# Patient Record
Sex: FEMALE | Race: BLACK OR AFRICAN AMERICAN | Employment: FULL TIME | URBAN - METROPOLITAN AREA
[De-identification: names, ages, dates, MRNs, and addresses within clinical notes are randomized per-mention and may not be internally consistent; named-entity substitution may affect disease eponyms.]

---

## 2018-05-14 ENCOUNTER — ANESTHESIA EVENT (OUTPATIENT)
Dept: SURGERY | Age: 58
End: 2018-05-14
Payer: COMMERCIAL

## 2018-05-14 NOTE — ANESTHESIA PREPROCEDURE EVALUATION
Anesthetic History               Review of Systems / Medical History  Patient summary reviewed, nursing notes reviewed and pertinent labs reviewed    Pulmonary                   Neuro/Psych              Cardiovascular                  Exercise tolerance: >4 METS     GI/Hepatic/Renal                Endo/Other    Diabetes: poorly controlled, type 2, using insulin         Other Findings            Physical Exam    Airway  Mallampati: I  TM Distance: 4 - 6 cm  Neck ROM: normal range of motion   Mouth opening: Normal     Cardiovascular  Regular rate and rhythm,  S1 and S2 normal,  no murmur, click, rub, or gallop             Dental    Dentition: Full upper dentures     Pulmonary  Breath sounds clear to auscultation               Abdominal  GI exam deferred       Other Findings            Anesthetic Plan    ASA: 2  Anesthesia type: regional - Lewellen block            Anesthetic plan and risks discussed with: Patient

## 2018-05-15 ENCOUNTER — ANESTHESIA (OUTPATIENT)
Dept: SURGERY | Age: 58
End: 2018-05-15
Payer: COMMERCIAL

## 2018-05-15 ENCOUNTER — HOSPITAL ENCOUNTER (OUTPATIENT)
Age: 58
Setting detail: OUTPATIENT SURGERY
Discharge: HOME | End: 2018-05-15
Attending: ORTHOPAEDIC SURGERY | Admitting: ORTHOPAEDIC SURGERY
Payer: COMMERCIAL

## 2018-05-15 VITALS
BODY MASS INDEX: 29.85 KG/M2 | RESPIRATION RATE: 16 BRPM | TEMPERATURE: 97.3 F | OXYGEN SATURATION: 91 % | HEART RATE: 57 BPM | SYSTOLIC BLOOD PRESSURE: 173 MMHG | WEIGHT: 158 LBS | DIASTOLIC BLOOD PRESSURE: 76 MMHG

## 2018-05-15 DIAGNOSIS — L76.82 PAIN AT SURGICAL INCISION: Primary | ICD-10-CM

## 2018-05-15 LAB
GLUCOSE BLD STRIP.AUTO-MCNC: 228 MG/DL (ref 65–100)
GLUCOSE BLD STRIP.AUTO-MCNC: 250 MG/DL (ref 65–100)

## 2018-05-15 PROCEDURE — 82962 GLUCOSE BLOOD TEST: CPT

## 2018-05-15 PROCEDURE — 77030010507 HC ADH SKN DERMBND J&J -B: Performed by: ORTHOPAEDIC SURGERY

## 2018-05-15 PROCEDURE — 74011000250 HC RX REV CODE- 250: Performed by: ORTHOPAEDIC SURGERY

## 2018-05-15 PROCEDURE — 77030033681 HC SPLNT P-CUT SAF BSNM -A: Performed by: ORTHOPAEDIC SURGERY

## 2018-05-15 PROCEDURE — 74011250637 HC RX REV CODE- 250/637: Performed by: ANESTHESIOLOGY

## 2018-05-15 PROCEDURE — 77030011640 HC PAD GRND REM COVD -A: Performed by: ORTHOPAEDIC SURGERY

## 2018-05-15 PROCEDURE — 77030011884 HC TAPE CST PLSTR BSNM -A: Performed by: ORTHOPAEDIC SURGERY

## 2018-05-15 PROCEDURE — 74011250636 HC RX REV CODE- 250/636

## 2018-05-15 PROCEDURE — 74011250636 HC RX REV CODE- 250/636: Performed by: ANESTHESIOLOGY

## 2018-05-15 PROCEDURE — 77030020143 HC AIRWY LARYN INTUB CGAS -A: Performed by: ANESTHESIOLOGY

## 2018-05-15 PROCEDURE — 76210000020 HC REC RM PH II FIRST 0.5 HR: Performed by: ORTHOPAEDIC SURGERY

## 2018-05-15 PROCEDURE — 76010000160 HC OR TIME 0.5 TO 1 HR INTENSV-TIER 1: Performed by: ORTHOPAEDIC SURGERY

## 2018-05-15 PROCEDURE — 88305 TISSUE EXAM BY PATHOLOGIST: CPT | Performed by: ORTHOPAEDIC SURGERY

## 2018-05-15 PROCEDURE — 76210000006 HC OR PH I REC 0.5 TO 1 HR: Performed by: ORTHOPAEDIC SURGERY

## 2018-05-15 PROCEDURE — 76060000033 HC ANESTHESIA 1 TO 1.5 HR: Performed by: ORTHOPAEDIC SURGERY

## 2018-05-15 PROCEDURE — 74011000250 HC RX REV CODE- 250

## 2018-05-15 PROCEDURE — 77030000032 HC CUF TRNQT ZIMM -B: Performed by: ORTHOPAEDIC SURGERY

## 2018-05-15 PROCEDURE — 77030018836 HC SOL IRR NACL ICUM -A: Performed by: ORTHOPAEDIC SURGERY

## 2018-05-15 RX ORDER — PROPOFOL 10 MG/ML
INJECTION, EMULSION INTRAVENOUS
Status: DISCONTINUED | OUTPATIENT
Start: 2018-05-15 | End: 2018-05-15 | Stop reason: HOSPADM

## 2018-05-15 RX ORDER — PROPOFOL 10 MG/ML
INJECTION, EMULSION INTRAVENOUS AS NEEDED
Status: DISCONTINUED | OUTPATIENT
Start: 2018-05-15 | End: 2018-05-15 | Stop reason: HOSPADM

## 2018-05-15 RX ORDER — LIDOCAINE HYDROCHLORIDE 10 MG/ML
0.1 INJECTION INFILTRATION; PERINEURAL AS NEEDED
Status: DISCONTINUED | OUTPATIENT
Start: 2018-05-15 | End: 2018-05-15 | Stop reason: HOSPADM

## 2018-05-15 RX ORDER — LIDOCAINE HYDROCHLORIDE 5 MG/ML
INJECTION, SOLUTION INFILTRATION; INTRAVENOUS AS NEEDED
Status: DISCONTINUED | OUTPATIENT
Start: 2018-05-15 | End: 2018-05-15 | Stop reason: HOSPADM

## 2018-05-15 RX ORDER — ONDANSETRON 2 MG/ML
INJECTION INTRAMUSCULAR; INTRAVENOUS AS NEEDED
Status: DISCONTINUED | OUTPATIENT
Start: 2018-05-15 | End: 2018-05-15 | Stop reason: HOSPADM

## 2018-05-15 RX ORDER — DEXAMETHASONE SODIUM PHOSPHATE 4 MG/ML
INJECTION, SOLUTION INTRA-ARTICULAR; INTRALESIONAL; INTRAMUSCULAR; INTRAVENOUS; SOFT TISSUE AS NEEDED
Status: DISCONTINUED | OUTPATIENT
Start: 2018-05-15 | End: 2018-05-15 | Stop reason: HOSPADM

## 2018-05-15 RX ORDER — MIDAZOLAM HYDROCHLORIDE 1 MG/ML
2 INJECTION, SOLUTION INTRAMUSCULAR; INTRAVENOUS
Status: DISCONTINUED | OUTPATIENT
Start: 2018-05-15 | End: 2018-05-15 | Stop reason: HOSPADM

## 2018-05-15 RX ORDER — SODIUM CHLORIDE 0.9 % (FLUSH) 0.9 %
5-10 SYRINGE (ML) INJECTION AS NEEDED
Status: DISCONTINUED | OUTPATIENT
Start: 2018-05-15 | End: 2018-05-15 | Stop reason: HOSPADM

## 2018-05-15 RX ORDER — OXYCODONE HYDROCHLORIDE 5 MG/1
10 TABLET ORAL
Status: DISCONTINUED | OUTPATIENT
Start: 2018-05-15 | End: 2018-05-15 | Stop reason: HOSPADM

## 2018-05-15 RX ORDER — SODIUM CHLORIDE 0.9 % (FLUSH) 0.9 %
5-10 SYRINGE (ML) INJECTION EVERY 8 HOURS
Status: DISCONTINUED | OUTPATIENT
Start: 2018-05-15 | End: 2018-05-15 | Stop reason: HOSPADM

## 2018-05-15 RX ORDER — MIDAZOLAM HYDROCHLORIDE 1 MG/ML
INJECTION, SOLUTION INTRAMUSCULAR; INTRAVENOUS AS NEEDED
Status: DISCONTINUED | OUTPATIENT
Start: 2018-05-15 | End: 2018-05-15 | Stop reason: HOSPADM

## 2018-05-15 RX ORDER — BUPIVACAINE HYDROCHLORIDE 2.5 MG/ML
INJECTION, SOLUTION EPIDURAL; INFILTRATION; INTRACAUDAL AS NEEDED
Status: DISCONTINUED | OUTPATIENT
Start: 2018-05-15 | End: 2018-05-15 | Stop reason: HOSPADM

## 2018-05-15 RX ORDER — CEFAZOLIN SODIUM 1 G/3ML
INJECTION, POWDER, FOR SOLUTION INTRAMUSCULAR; INTRAVENOUS AS NEEDED
Status: DISCONTINUED | OUTPATIENT
Start: 2018-05-15 | End: 2018-05-15 | Stop reason: HOSPADM

## 2018-05-15 RX ORDER — CEFAZOLIN SODIUM/WATER 2 G/20 ML
2 SYRINGE (ML) INTRAVENOUS ONCE
Status: DISCONTINUED | OUTPATIENT
Start: 2018-05-15 | End: 2018-05-15 | Stop reason: HOSPADM

## 2018-05-15 RX ORDER — SODIUM CHLORIDE, SODIUM LACTATE, POTASSIUM CHLORIDE, CALCIUM CHLORIDE 600; 310; 30; 20 MG/100ML; MG/100ML; MG/100ML; MG/100ML
125 INJECTION, SOLUTION INTRAVENOUS CONTINUOUS
Status: DISCONTINUED | OUTPATIENT
Start: 2018-05-15 | End: 2018-05-15 | Stop reason: HOSPADM

## 2018-05-15 RX ORDER — NALOXONE HYDROCHLORIDE 0.4 MG/ML
0.1 INJECTION, SOLUTION INTRAMUSCULAR; INTRAVENOUS; SUBCUTANEOUS AS NEEDED
Status: DISCONTINUED | OUTPATIENT
Start: 2018-05-15 | End: 2018-05-15 | Stop reason: HOSPADM

## 2018-05-15 RX ORDER — OXYCODONE HYDROCHLORIDE 5 MG/1
5 CAPSULE ORAL
Qty: 60 CAP | Refills: 0 | Status: SHIPPED | OUTPATIENT
Start: 2018-05-15 | End: 2022-05-06

## 2018-05-15 RX ADMIN — PROPOFOL 20 MG: 10 INJECTION, EMULSION INTRAVENOUS at 09:33

## 2018-05-15 RX ADMIN — PROPOFOL 10 MG: 10 INJECTION, EMULSION INTRAVENOUS at 09:26

## 2018-05-15 RX ADMIN — OXYCODONE HYDROCHLORIDE 10 MG: 5 TABLET ORAL at 10:35

## 2018-05-15 RX ADMIN — PROPOFOL 20 MG: 10 INJECTION, EMULSION INTRAVENOUS at 09:36

## 2018-05-15 RX ADMIN — PROPOFOL 20 MG: 10 INJECTION, EMULSION INTRAVENOUS at 09:30

## 2018-05-15 RX ADMIN — ONDANSETRON 4 MG: 2 INJECTION INTRAMUSCULAR; INTRAVENOUS at 09:57

## 2018-05-15 RX ADMIN — SODIUM CHLORIDE, SODIUM LACTATE, POTASSIUM CHLORIDE, AND CALCIUM CHLORIDE 125 ML/HR: 600; 310; 30; 20 INJECTION, SOLUTION INTRAVENOUS at 09:06

## 2018-05-15 RX ADMIN — PROPOFOL 160 MCG/KG/MIN: 10 INJECTION, EMULSION INTRAVENOUS at 09:26

## 2018-05-15 RX ADMIN — CEFAZOLIN SODIUM 2 G: 1 INJECTION, POWDER, FOR SOLUTION INTRAMUSCULAR; INTRAVENOUS at 09:22

## 2018-05-15 RX ADMIN — MIDAZOLAM HYDROCHLORIDE 2 MG: 1 INJECTION, SOLUTION INTRAMUSCULAR; INTRAVENOUS at 09:06

## 2018-05-15 RX ADMIN — DEXAMETHASONE SODIUM PHOSPHATE 4 MG: 4 INJECTION, SOLUTION INTRA-ARTICULAR; INTRALESIONAL; INTRAMUSCULAR; INTRAVENOUS; SOFT TISSUE at 09:57

## 2018-05-15 RX ADMIN — PROPOFOL 10 MG: 10 INJECTION, EMULSION INTRAVENOUS at 09:25

## 2018-05-15 RX ADMIN — MIDAZOLAM HYDROCHLORIDE 1 MG: 1 INJECTION, SOLUTION INTRAMUSCULAR; INTRAVENOUS at 09:25

## 2018-05-15 RX ADMIN — MIDAZOLAM HYDROCHLORIDE 1 MG: 1 INJECTION, SOLUTION INTRAMUSCULAR; INTRAVENOUS at 09:30

## 2018-05-15 RX ADMIN — LIDOCAINE HYDROCHLORIDE 50 ML: 5 INJECTION, SOLUTION INFILTRATION; INTRAVENOUS at 09:31

## 2018-05-15 RX ADMIN — PROPOFOL 150 MG: 10 INJECTION, EMULSION INTRAVENOUS at 09:41

## 2018-05-15 NOTE — ADDENDUM NOTE
Addendum  created 05/15/18 1114 by Cuca Phipps, CRNA    Anesthesia Intra Meds edited, Orders acknowledged in Narrator

## 2018-05-15 NOTE — ANESTHESIA PROCEDURE NOTES
Peripheral Block    Start time: 5/15/2018 9:26 AM  End time: 5/15/2018 9:33 AM  Performed by: Cece Bianchi  Authorized by: Angelina Rosas       Pre-procedure:    Indications: primary anesthetic    Preanesthetic Checklist: patient identified, risks and benefits discussed, site marked, timeout performed, anesthesia consent given and patient being monitored    Timeout Time: 09:25          Block Type:   Block Type:  Marcella block  Laterality:  Right  Patient Position:  Flat  Block Limb IV Checked: Yes    Esmarch exsanguination: Yes    Tourniquet Type:  Single  Tourniquet Location:  Above elbow  Tourniquet Inflated:  5/15/2018 9:30 AM  Inflation (mmHg):  275  Limb w/o Radial Pulse: Yes    Infused Agent:  Lidocaine 0.5%  Volume Infused (mL):  50  Tourniquet Deflated:  5/15/2018 10:05 AM    Assessment:    Injection Assessment:   Patient tolerance:  Patient tolerated the procedure well with no immediate complications

## 2018-05-15 NOTE — OP NOTES
[facility] REPORT    Rhonda Okeefe  MR#: [id number]  : 1960  ACCOUNT #: [de-identified]   DATE OF SERVICE: 05/15/2018    SURGEON:  Angela You MD    PREOPERATIVE DIAGNOSIS:  Right wrist dorsal ganglion cyst.    POSTOPERATIVE DIAGNOSIS:  Right wrist dorsal ganglion cyst.    PROCEDURE:  Right wrist dorsal soft tissue mass excision. ANESTHESIA:  Floral block converted to LMA. COMPLICATIONS:  None. SPECIMENS REMOVED:  Soft tissue mass for pathology. BLOOD LOSS:  Minimal.    IMPLANTS:  None. MEDICATIONS: 30 mL of 0.25% Marcaine plain. ASSISTANT:  none    PROCEDURE IN DETAIL:  After discussion of risks, benefits and alternatives, the patient expressed an understanding and strongly desired to proceed with surgical treatment. She was brought into the operating room. Floral block anesthesia was administered. The right upper extremity was prepped and draped in normal sterile fashion. The patient became somewhat confused and combative. She appeared to be medically stable. This appeared to be due to disinhibition from IV medications as well as possibly some pain from her blood pressure cuff. Ultimately, it was decided to convert to a laryngeal mask anesthesia. After that was complete, a timeout was performed. A transverse incision was made directly over the palpable dorsal mass. Meticulous dissection was carried out down to the level of the mass. The adjacent tendons were retracted in radial and ulnar directions in order to protect them. Meticulous dissection of the mass was carried out down to the level of the dorsal radiocarpal joint. There was a complex stalk to the mass rather than a simple stalk. That was excised. Palpation and inspection and probing were performed to verify that there was no residual cyst tissue. The mass appeared to be a complex ganglion cyst.  It was passed off for pathological evaluation.   The wound was copiously irrigated. A cosmetic skin closure was accomplished. Sterile dressings were applied. The patient tolerated the procedure well. There were no complications. The resected mass was sent for pathology. POSTOPERATIVE PLAN:  The patient was placed into a volar splint. She will have her sutures removed at 2 weeks postop and be converted into a Velcro wrist splint. She can perform gentle range of motion exercises.       MD KITA Barber / RN  D: 05/15/2018 10:16     T: 05/15/2018 10:34  JOB #: [id number]

## 2018-05-15 NOTE — IP AVS SNAPSHOT
[address] [facility] 
[phone] Patient: Luke Kelley MRN: [id number] About your hospitalization You were admitted on:  May 15, 2018 You last received care in the:  [facility] OP PACU You were discharged on:  May 15, 2018 Why you were hospitalized Your primary diagnosis was:  Not on File Follow-up Information Follow up With Details Comments Contact Info Rhianna Jean-Baptiste MD  Keep your follow up appointment as scheduled. [address] [facility] [address] Garfield Dominguez NP   [address] [facility] 
[phone] Discharge Orders None A check lior indicates which time of day the medication should be taken. My Medications START taking these medications Instructions Each Dose to Equal  
 Morning Noon Evening Bedtime  
 oxyCODONE 5 mg capsule Commonly known as:  OXYIR Your last dose was: Your next dose is: Take 1 Cap by mouth every four (4) hours as needed. Max Daily Amount: 30 mg.  
 5 mg CONTINUE taking these medications Instructions Each Dose to Equal  
 Morning Noon Evening Bedtime BASAGLAR KWIKPEN U-100 INSULIN SC Your last dose was: Your next dose is:    
   
   
 50 Units by SubCUTAneous route every twelve (12) hours. 50 Units LIPITOR PO Your last dose was: Your next dose is: Take  by mouth nightly. metFORMIN 1,000 mg tablet Commonly known as:  GLUCOPHAGE Your last dose was: Your next dose is:    
   
   
 take 1,000 mg by mouth two (2) times daily (with meals). 1000 mg Where to Get Your Medications Information on where to get these meds will be given to you by the nurse or doctor. ! Ask your nurse or doctor about these medications  
  oxyCODONE 5 mg capsule Opioid Education Prescription Opioids: What You Need to Know: 
 
Prescription opioids can be used to help relieve moderate-to-severe pain and are often prescribed following a surgery or injury, or for certain health conditions. These medications can be an important part of treatment but also come with serious risks. Opioids are strong pain medicines. Examples include hydrocodone, oxycodone, fentanyl, and morphine. Heroin is an example of an illegal opioid. It is important to work with your health care provider to make sure you are getting the safest, most effective care. WHAT ARE THE RISKS AND SIDE EFFECTS OF OPIOID USE? Prescription opioids carry serious risks of addiction and overdose, especially with prolonged use. An opioid overdose, often marked by slow breathing, can cause sudden death. The use of prescription opioids can have a number of side effects as well, even when taken as directed. · Tolerance-meaning you might need to take more of a medication for the same pain relief · Physical dependence-meaning you have symptoms of withdrawal when the medication is stopped. Withdrawal symptoms can include nausea, sweating, chills, diarrhea, stomach cramps, and muscle aches. Withdrawal can last up to several weeks, depending on which drug you took and how long you took it. · Increased sensitivity to pain · Constipation · Nausea, vomiting, and dry mouth · Sleepiness and dizziness · Confusion · Depression · Low levels of testosterone that can result in lower sex drive, energy, and strength · Itching and sweating RISKS ARE GREATER WITH:      
· History of drug misuse, substance use disorder, or overdose · Mental health conditions (such as depression or anxiety) · Sleep apnea · Older age (72 years or older) · Pregnancy Avoid alcohol while taking prescription opioids.   Also, unless specifically advised by your health care provider, medications to avoid include: · Benzodiazepines (such as Xanax or Valium) · Muscle relaxants (such as Soma or Flexeril) · Hypnotics (such as Ambien or Lunesta) · Other prescription opioids KNOW YOUR OPTIONS Talk to your health care provider about ways to manage your pain that don't involve prescription opioids. Some of these options may actually work better and have fewer risks and side effects. Options may include: 
· Pain relievers such as acetaminophen, ibuprofen, and naproxen · Some medications that are also used for depression or seizures · Physical therapy and exercise · Counseling to help patients learn how to cope better with triggers of pain and stress. · Application of heat or cold compress · Massage therapy · Relaxation techniques Be Informed Make sure you know the name of your medication, how much and how often to take it, and its potential risks & side effects. IF YOU ARE PRESCRIBED OPIOIDS FOR PAIN: 
· Never take opioids in greater amounts or more often than prescribed. Remember the goal is not to be pain-free but to manage your pain at a tolerable level. · Follow up with your primary care provider to: · Work together to create a plan on how to manage your pain. · Talk about ways to help manage your pain that don't involve prescription opioids. · Talk about any and all concerns and side effects. · Help prevent misuse and abuse. · Never sell or share prescription opioids · Help prevent misuse and abuse. · Store prescription opioids in a secure place and out of reach of others (this may include visitors, children, friends, and family). · Safely dispose of unused/unwanted prescription opioids: Find your community drug take-back program or your pharmacy mail-back program, or flush them down the toilet, following guidance from the Food and Drug Administration (www.fda.gov/Drugs/ResourcesForYou). · Visit www.cdc.gov/drugoverdose to learn about the risks of opioid abuse and overdose. · If you believe you may be struggling with addiction, tell your health care provider and ask for guidance or call Zachery Crenshaw at 0-865-584-WZGT. Discharge Instructions Elevate operative hand Move fingers often Expect finger swelling Call office for questions or problems Keep splint and dressing in place until return visit ACTIVITY · As tolerated and as directed by your doctor. · Bathe or shower as directed by your doctor. DIET · Clear liquids until no nausea or vomiting; then light diet for the first day. · Advance to regular diet on second day, unless your doctor orders otherwise. · If nausea and vomiting continues, call your doctor. PAIN 
· Take pain medication as directed by your doctor. · Call your doctor if pain is NOT relieved by medication. · DO NOT take aspirin of blood thinners unless directed by your doctor. DRESSING CARE  
 
 
CALL YOUR DOCTOR IF  
· Excessive bleeding that does not stop after holding pressure over the area · Temperature of 101 degrees F or above · Excessive redness, swelling or bruising, and/ or green or yellow, smelly discharge from incision AFTER ANESTHESIA · For the first 24 hours: DO NOT Drive, Drink alcoholic beverages, or Make important decisions. · Be aware of dizziness following anesthesia and while taking pain medication. APPOINTMENT DATE/ TIME 
 
YOUR DOCTOR'S PHONE NUMBER  
 
 
DISCHARGE SUMMARY from Nurse PATIENT INSTRUCTIONS: 
 
After general anesthesia or intravenous sedation, for 24 hours or while taking prescription Narcotics: · Limit your activities · Do not drive and operate hazardous machinery · Do not make important personal or business decisions · Do  not drink alcoholic beverages · If you have not urinated within 8 hours after discharge, please contact your surgeon on call. *  Please give a list of your current medications to your Primary Care Provider. *  Please update this list whenever your medications are discontinued, doses are 
    changed, or new medications (including over-the-counter products) are added. *  Please carry medication information at all times in case of emergency situations. These are general instructions for a healthy lifestyle: No smoking/ No tobacco products/ Avoid exposure to second hand smoke Surgeon General's Warning:  Quitting smoking now greatly reduces serious risk to your health. Obesity, smoking, and sedentary lifestyle greatly increases your risk for illness A healthy diet, regular physical exercise & weight monitoring are important for maintaining a healthy lifestyle You may be retaining fluid if you have a history of heart failure or if you experience any of the following symptoms:  Weight gain of 3 pounds or more overnight or 5 pounds in a week, increased swelling in our hands or feet or shortness of breath while lying flat in bed. Please call your doctor as soon as you notice any of these symptoms; do not wait until your next office visit. Recognize signs and symptoms of STROKE: 
 
F-face looks uneven A-arms unable to move or move unevenly S-speech slurred or non-existent T-time-call 911 as soon as signs and symptoms begin-DO NOT go Back to bed or wait to see if you get better-TIME IS BRAIN. Introducing [facility] HE[facility]! [employer] introduces Blogic patient portal. Now you can access parts of your medical record, email your doctor's office, and request medication refills online. 1. In your internet browser, go to [email or web address] 2. Click on the First Time User? Click Here link in the Sign In box. You will see the New Member Sign Up page. 3. Enter your "[employer] Access Code exactly as it appears below. You will not need to use this code after youve completed the sign-up process. If you do not sign up before the expiration date, you must request a new code. · "[employer] Access Code: [email or web address] Expires: 8/8/2018  5:04 PM 
 
4. Enter the last four digits of your Social Security Number (xxxx) and Date of Birth (mm/dd/yyyy) as indicated and click Submit. You will be taken to the next sign-up page. 5. Create a Gogii Gamest ID. This will be your "[employer] login ID and cannot be changed, so think of one that is secure and easy to remember. 6. Create a "[employer] password. You can change your password at any time. 7. Enter your Password Reset Question and Answer. This can be used at a later time if you forget your password. 8. Enter your e-mail address. You will receive e-mail notification when new information is available in [address] 9. Click Sign Up. You can now view and download portions of your medical record. 10. Click the Download Summary menu link to download a portable copy of your medical information. If you have questions, please visit the Frequently Asked Questions section of the "[employer] website. Remember, "[employer] is NOT to be used for urgent needs. For medical emergencies, dial 911. Now available from your iPhone and Android! Introducing Guru Rahman As a [employer] patient, I wanted to make you aware of our electronic visit tool called Guru Dagosissy. [employer] 24/7 allows you to connect within minutes with a medical provider 24 hours a day, seven days a week via a mobile device or tablet or logging into a secure website from your computer. You can access Guru Rahman from anywhere in the [place].  
 
A virtual visit might be right for you when you have a simple condition and feel like you just dont want to get out of bed, or cant get away from work for an appointment, when your regular [facility] provider is not available (evenings, weekends or holidays), or when youre out of town and need minor care. Electronic visits cost only $49 and if the [employer] [facility] 24/7 provider determines a prescription is needed to treat your condition, one can be electronically transmitted to a nearby pharmacy*. Please take a moment to enroll today if you have not already done so. The enrollment process is free and takes just a few minutes. To enroll, please download the [employer] 24/7 samara to your tablet or phone, or visit [email or web address] to enroll on your computer. And, as an [address] [place] [address] patient with a [employer] account, the results of your visits will be scanned into your electronic medical record and your primary care provider will be able to view the scanned results. We urge you to continue to see your regular [facility] provider for your ongoing medical care. And while your primary care provider may not be the one available when you seek a Guru Sliced Investingsissy virtual visit, the peace of mind you get from getting a real diagnosis real time can be priceless. For more information on [employer]ah[employer], view our Frequently Asked Questions (FAQs) at [email or web address]. Sincerely, 
 
Blanche Coombs MD 
Chief Medical Officer [address] *:  certain medications cannot be prescribed via [employer] Providers Seen During Your Hospitalization Provider Specialty Primary office phone Sarah Bangura MD Orthopedic Surgery [phone] Your Primary Care Physician (PCP) Primary Care Physician Office Phone Office Fax [place] [address] [phone] You are allergic to the following No active allergies Recent Documentation Weight BMI OB Status Smoking Status 71.7 kg 29.85 kg/m2 Hysterectomy Former Smoker Emergency Contacts Name Discharge Info Relation Home Work Mobile Joy Mcintosh  Daughter [21] [phone] Patient Belongings The following personal items are in your possession at time of discharge: 
  Dental Appliances: Uppers  Visual Aid: None      Home Medications: None   Jewelry: None  Clothing: Undergarments, Pants, Shirt, Footwear    Other Valuables: None Please provide this summary of care documentation to your next provider. Signatures-by signing, you are acknowledging that this After Visit Summary has been reviewed with you and you have received a copy. Patient Signature:  ____________________________________________________________ Date:  ____________________________________________________________  
  
Claudene Born Provider Signature:  ____________________________________________________________ Date:  ____________________________________________________________

## 2018-05-15 NOTE — DISCHARGE INSTRUCTIONS
Elevate operative hand  Move fingers often  Expect finger swelling  Call office for questions or problems  Keep splint and dressing in place until return visit     ACTIVITY  · As tolerated and as directed by your doctor. · Bathe or shower as directed by your doctor. DIET  · Clear liquids until no nausea or vomiting; then light diet for the first day. · Advance to regular diet on second day, unless your doctor orders otherwise. · If nausea and vomiting continues, call your doctor. PAIN  · Take pain medication as directed by your doctor. · Call your doctor if pain is NOT relieved by medication. · DO NOT take aspirin of blood thinners unless directed by your doctor. DRESSING CARE       CALL YOUR DOCTOR IF   · Excessive bleeding that does not stop after holding pressure over the area  · Temperature of 101 degrees F or above  · Excessive redness, swelling or bruising, and/ or green or yellow, smelly discharge from incision    AFTER ANESTHESIA   · For the first 24 hours: DO NOT Drive, Drink alcoholic beverages, or Make important decisions. · Be aware of dizziness following anesthesia and while taking pain medication. APPOINTMENT DATE/ TIME    YOUR DOCTOR'S PHONE NUMBER       DISCHARGE SUMMARY from Nurse    PATIENT INSTRUCTIONS:    After general anesthesia or intravenous sedation, for 24 hours or while taking prescription Narcotics:  · Limit your activities  · Do not drive and operate hazardous machinery  · Do not make important personal or business decisions  · Do  not drink alcoholic beverages  · If you have not urinated within 8 hours after discharge, please contact your surgeon on call. *  Please give a list of your current medications to your Primary Care Provider. *  Please update this list whenever your medications are discontinued, doses are      changed, or new medications (including over-the-counter products) are added.     *  Please carry medication information at all times in case of emergency situations. These are general instructions for a healthy lifestyle:    No smoking/ No tobacco products/ Avoid exposure to second hand smoke    Surgeon General's Warning:  Quitting smoking now greatly reduces serious risk to your health. Obesity, smoking, and sedentary lifestyle greatly increases your risk for illness    A healthy diet, regular physical exercise & weight monitoring are important for maintaining a healthy lifestyle    You may be retaining fluid if you have a history of heart failure or if you experience any of the following symptoms:  Weight gain of 3 pounds or more overnight or 5 pounds in a week, increased swelling in our hands or feet or shortness of breath while lying flat in bed. Please call your doctor as soon as you notice any of these symptoms; do not wait until your next office visit. Recognize signs and symptoms of STROKE:    F-face looks uneven    A-arms unable to move or move unevenly    S-speech slurred or non-existent    T-time-call 911 as soon as signs and symptoms begin-DO NOT go       Back to bed or wait to see if you get better-TIME IS BRAIN.

## 2018-05-15 NOTE — ANESTHESIA POSTPROCEDURE EVALUATION
Post-Anesthesia Evaluation and Assessment    Patient: Latrice Freeman MRN: [id number]  SSN: [id number]    YOB: 1960  Age: 59 y.o. Sex: female       Cardiovascular Function/Vital Signs  Visit Vitals    /76    Pulse (!) 57    Temp 36.3 °C (97.3 °F)    Resp 14    Wt 71.7 kg (158 lb)    SpO2 91%    BMI 29.85 kg/m2       Patient is status post regional anesthesia for Procedure(s):  GANGLION CYST EXCISION RIGHT. Nausea/Vomiting: None    Postoperative hydration reviewed and adequate. Pain:  Pain Scale 1: Numeric (0 - 10) (05/15/18 1021)  Pain Intensity 1: 6 (05/15/18 1021)   Managed    Neurological Status:   Neuro (WDL): Exceptions to WDL (05/15/18 1021)  Neuro  Neurologic State: Drowsy (05/15/18 1021)  LUE Motor Response: Purposeful (05/15/18 1021)  LLE Motor Response: Purposeful (05/15/18 1021)  RUE Motor Response: Purposeful (05/15/18 1021)  RLE Motor Response: Purposeful (05/15/18 1021)   At baseline    Mental Status and Level of Consciousness: Arousable    Pulmonary Status:   O2 Device: Room air (05/15/18 1053)   Adequate oxygenation and airway patent    Complications related to anesthesia: None    Post-anesthesia assessment completed.  No concerns    Signed By: Ryan Shen MD     May 15, 2018

## 2018-05-30 ENCOUNTER — HOSPITAL ENCOUNTER (OUTPATIENT)
Dept: PHYSICAL THERAPY | Age: 58
End: 2018-05-30

## 2018-06-04 ENCOUNTER — HOSPITAL ENCOUNTER (OUTPATIENT)
Dept: PHYSICAL THERAPY | Age: 58
Discharge: HOME | End: 2018-06-04
Payer: COMMERCIAL

## 2018-06-04 PROCEDURE — 97110 THERAPEUTIC EXERCISES: CPT

## 2018-06-04 PROCEDURE — 97161 PT EVAL LOW COMPLEX 20 MIN: CPT

## 2018-06-04 NOTE — THERAPY EVALUATION
Autumn Brown  : 1960  Primary: Isaac Mullenie Jacky Of [facility]as*  Secondary:  [facility]rapy Center at [address], [address] [place] [address],[address], [place] [address].  [phone]   [phone]        OUTPATIENT PHYSICAL THERAPY:Initial Assessment 2018 1   ICD-10: Treatment Diagnosis: Pain in right wrist (M25.531)  Precautions/Allergies: no precautions/ NKA   Fall Risk Score: 0 (? 5 = High Risk)  MD Orders: PT-evaluate and treat MEDICAL/REFERRING DIAGNOSIS:  M67.431 Right wrist  DATE OF ONSET: 5/15/18  REFERRING PHYSICIAN: Donny Monte MD  RETURN PHYSICIAN APPOINTMENT: 18     INITIAL ASSESSMENT:  Ms. Arthur Stewart comes to therapy S/P ganglion cyst removal at the right wrist. She presents with decreased wrist and forearm motion; weakness in the wrist, hand and fingers; movement pain and numbness over the dorsum of her wrist and hand. She is not taking the prescribed pain medication due to unavailability at the pharmacy. She uses her hands during the entire shift at work and also needs to care for her home and grand children. Ms. Arthur Stewart will benefit from skilled therapy to address her deficits and assist in the functional return of her dominant hand for independent ADL's and job duties. PROBLEM LIST (Impacting functional limitations):  1. Decreased Strength  2. Decreased ADL/Functional Activities  3. Increased Pain  4. Decreased Activity Tolerance INTERVENTIONS PLANNED:  1. Cold  2. Heat  3. Home Exercise Program (HEP)  4. Manual Therapy  5. Range of Motion (ROM)  6. Therapeutic Exercise/Strengthening  7. Ultrasound (US)   TREATMENT PLAN:  Effective Dates: 2018 TO 8/3/2018 (60 days). Frequency/Duration: 2-3 times a week for 60 Days  GOALS: (Goals have been discussed and agreed upon with patient.)  Discharge Goals: Time Frame:  60 days     Independent with HEP  2. AROM WNL to allow independent ADL's without compensation  3. Strength 5/5 to allow safe transfers Tolerate gait > 45 minutes for community integration  2. ADL's with pain < 2/10  Rehabilitation Potential For Stated Goals: Good  Regarding Nay Polanco's therapy, I certify that the treatment plan above will be carried out by a therapist or under their direction. Thank you for this referral,  Rafia Longo, PT ,MSPT      Referring Physician Signature: Chapo Priest MD              Date                    The information in this section was collected on 6/4/18 (except where otherwise noted). HISTORY:   History of Present Injury/Illness (Reason for Referral):  Pt reports having the cyst for years but that it began to increase in size and become so painful that she was unable to write or lift with the right hand. Past Medical History/Comorbidities:   Ms. Kym Booth  has a past medical history of Diabetes. Unremarkable for the right wrist  Social History/Living Environment:     lives with family in apartment. 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 Personal Factors: Other factors that influence how disability is experienced by the patient:  Job requirements, have to drive 30 miles each way to work  Current Medications:       Current Outpatient Prescriptions:    * Gabapentin for shingles    insulin glargine,hum.rec.anlog (BASAGLAR KWIKPEN U-100 INSULIN SC)- may stop due to cost going up to $800     atorvastatin calcium (LIPITOR PO), Take  by mouth nightly., Disp: , Rfl:     METFORMIN 1,000 mg tablet, take 1,000 mg by mouth two (2) times daily (with meals). , Disp: , Rfl:    Date Last Reviewed:  6/4/18   Number of Personal Factors/Comorbidities that affect the Plan of Care: 1-2: MODERATE COMPLEXITY   EXAMINATION:   Observation/Orthostatic Postural Assessment: Steri strips intact covered with Water resistant bandage. No swelling. No redness. Incision healing well   Palpation:          Diminished sensation over the carpal region/ dorsum of the right hand . ROM: BUE WNL except:                RUE AROM  R Forearm Supination: 75  R Wrist Flexion: 14  R Wrist Extension: 37                        Strength: lumbricals 4-, dorsal interossei 4-, thumb 5/5       RUE Strength  R Forearm Supination: 4+  R Wrist Flexion: 4-  R Wrist Extension: 3+ (observed)              Body Structures Involved:  1. Nerves  2. Joints  3. Muscles  4. Ligaments  5. skin Body Functions Affected:  1. Sensory/Pain  2. Movement Related Activities and Participation Affected:  1. General Tasks and Demands  2. Self Care  3. Domestic Life   Number of elements (examined above) that affect the Plan of Care: 3: MODERATE COMPLEXITY   CLINICAL PRESENTATION:   Presentation: Stable and uncomplicated: LOW COMPLEXITY   CLINICAL DECISION MAKING:   Outcome Measure: Tool Used: Disabilities of the Arm, Shoulder and Hand (DASH) Questionnaire - Quick Version  Score:  Initial: 37/55 or 59% Most Recent: X/55 (Date: -- )   Interpretation of Score: The DASH is designed to measure the activities of daily living in person's with upper extremity dysfunction or pain. Each section is scored on a 1-5 scale, 5 representing the greatest disability. The scores of each section are added together for a total score of 55. Score 11 12-19 20-28 29-37 38-45 46-54 55   Modifier CH CI CJ CK CL CM CN     ? Carrying, Moving, and Handling Objects:     - CURRENT STATUS: CK - 40%-59% impaired, limited or restricted    - GOAL STATUS: CJ - 20%-39% impaired, limited or restricted    - D/C STATUS:  ---------------To be determined---------------      Medical Necessity:   · Skilled intervention continues to be required due to need for modalities.   Reason for Services/Other Comments:  · Patient continues to require skilled intervention due to need for progression of ROM and guidance into exercercises. Use of outcome tool(s) and clinical judgement create a POC that gives a: Questionable prediction of patient's progress: MODERATE COMPLEXITY            TREATMENT:   (In addition to Assessment/Re-Assessment sessions the following treatments were rendered)  Pre-treatment Symptoms/Complaints:  Pt complains of numbness over the top of her hand, weakness in her fingers and pain with movement. Pain: Initial:     7/10 Post Session:  5-6/10     THERAPEUTIC EXERCISE: (10 minutes):  Exercises to improve mobility and strength. Required moderate verbal and manual cues to promote proper body posture and exercise technique. Reviewed and issued HEP with wrist flex/extn, ball squeezes and finger extension with small rubber band. Copy issued  MANUAL THERAPY: (5 minutes minutes): for ROM. PROM to right wrist flexion and extension with respect to pain  [employer] Portal  Treatment/Session Assessment:    · Response to Treatment:  Patient fearful of \"splitting it back open\". Good return demonstration of AROM exercises  · Compliance with Program/Exercises: Will assess as treatment progresses. · Recommendations/Intent for next treatment session: \"Next visit will focus on Manual treatment, Modalities and exercise modification as needed  · \".   Total Treatment Duration: 55 minutes  PT Patient Time In/Time Out  Time In: 0900  Time Out: Bonnie Denney 150, PT

## 2018-06-04 NOTE — PROGRESS NOTES
Ambulatory/Rehab Services H2 Model Falls Risk Assessment    Risk Factor Pts. ·   Confusion/Disorientation/Impulsivity  []    4 ·   Symptomatic Depression  []   2 ·   Altered Elimination  []   1 ·   Dizziness/Vertigo  []   1 ·   Gender (Male)  []   1 ·   Any administered antiepileptics (anticonvulsants):  []   2 ·   Any administered benzodiazepines:  []   1 ·   Visual Impairment (specify):  []   1 ·   Portable Oxygen Use  []   1 ·   Orthostatic ? BP  []   1 ·   History of Recent Falls (within 3 mos.)  []   5     Ability to Rise from Chair (choose one) Pts. ·   Ability to rise in a single movement  [x]   0 ·   Pushes up, successful in one attempt  []   1 ·   Multiple attempts, but successful  []   3 ·   Unable to rise without assistance  []   4   Total: (5 or greater = High Risk) 0     Falls Prevention Plan:   []                Physical Limitations to Exercise (specify):   []                Mobility Assistance Device (type):   []                Exercise/Equipment Adaptation (specify):    ©2010 [facility] [address] [place] [address] Patent #[id number].  Federal Law prohibits the replication, distribution or use without written permission from [facility] [employer]

## 2018-06-07 ENCOUNTER — HOSPITAL ENCOUNTER (OUTPATIENT)
Dept: PHYSICAL THERAPY | Age: 58
Discharge: HOME | End: 2018-06-07
Payer: COMMERCIAL

## 2018-06-07 PROCEDURE — 97140 MANUAL THERAPY 1/> REGIONS: CPT

## 2018-06-07 NOTE — PROGRESS NOTES
Christina Espinosa  : 1960  Primary: [facility] Jason Vogel [facility]*  Secondary:  Therapy Center at [address] [place] [address] Rd  [address] [facility], [address] [place] [address],[address], [place] [address].  [phone]   [phone]        OUTPATIENT PHYSICAL THERAPY:Daily Note 2018 2   ICD-10: Treatment Diagnosis: Pain in right wrist (M25.531)  Precautions/Allergies: no precautions/ NKA   Fall Risk Score: 0 (? 5 = High Risk)  MD Orders: PT-evaluate and treat MEDICAL/REFERRING DIAGNOSIS:  M67.431 Right wrist  DATE OF ONSET: 5/15/18  REFERRING PHYSICIAN: Fady Bruossard MD  RETURN PHYSICIAN APPOINTMENT: 18     INITIAL ASSESSMENT:  Ms. Paula Faust comes to therapy S/P ganglion cyst removal at the right wrist. She presents with decreased wrist and forearm motion; weakness in the wrist, hand and fingers; movement pain and numbness over the dorsum of her wrist and hand. She is not taking the prescribed pain medication due to unavailability at the pharmacy. She uses her hands during the entire shift at work and also needs to care for her home and grand children. Ms. Paula Faust will benefit from skilled therapy to address her deficits and assist in the functional return of her dominant hand for independent ADL's and job duties. PROBLEM LIST (Impacting functional limitations):  1. Decreased Strength  2. Decreased ADL/Functional Activities  3. Increased Pain  4. Decreased Activity Tolerance INTERVENTIONS PLANNED:  1. Cold  2. Heat  3. Home Exercise Program (HEP)  4. Manual Therapy  5. Range of Motion (ROM)  6. Therapeutic Exercise/Strengthening  7. Ultrasound (US)   TREATMENT PLAN:  Effective Dates: 2018 TO 8/3/2018 (60 days). Frequency/Duration: 2-3 times a week for 60 Days  GOALS: (Goals have been discussed and agreed upon with patient.)  Discharge Goals: Time Frame:  60 days  Independent with HEP  2. AROM WNL to allow independent ADL's without compensation  3. Strength 5/5 to allow safe lifting      Tolerate activity > 45 minutes for home care  2. ADL's with pain < 2/10    Rehabilitation Potential For Stated Goals: Good  Regarding Opal Polanco's therapy, I certify that the treatment plan above will be carried out by a therapist or under their direction. Thank you for this referral,  Caio Larios, PT                 The information in this section was collected on 6/4/18 (except where otherwise noted). HISTORY:   History of Present Injury/Illness (Reason for Referral):  Pt reports having the cyst for years but that it began to increase in size and become so painful that she was unable to write or lift with the right hand. Past Medical History/Comorbidities:   Ms. Damian Barney  has a past medical history of Diabetes. Unremarkable for the right wrist  Social History/Living Environment:     lives with family in apartment. 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 Personal Factors: Other factors that influence how disability is experienced by the patient:  Job requirements, have to drive 30 miles each way to work  Current Medications:       Current Outpatient Prescriptions:    * Gabapentin for shingles    insulin glargine,hum.rec.anlog (BASAGLAR KWIKPEN U-100 INSULIN SC)- may stop due to cost going up to $800     atorvastatin calcium (LIPITOR PO), Take  by mouth nightly., Disp: , Rfl:     METFORMIN 1,000 mg tablet, take 1,000 mg by mouth two (2) times daily (with meals). , Disp: , Rfl:    Date Last Reviewed:  6/4/18   Number of Personal Factors/Comorbidities that affect the Plan of Care: 1-2: MODERATE COMPLEXITY   EXAMINATION:   Observation/Orthostatic Postural Assessment:         Steri strips intact covered with Water resistant bandage.   Palpation:          n/t        ROM: BUE WNL except:                                         Strength: n/t                      Body Structures Involved:  1. Nerves  2. Joints  3. Muscles  4. Ligaments  5. skin Body Functions Affected:  1. Sensory/Pain  2. Movement Related Activities and Participation Affected:  1. General Tasks and Demands  2. Self Care  3. Domestic Life   Number of elements (examined above) that affect the Plan of Care: 3: MODERATE COMPLEXITY   CLINICAL PRESENTATION:   Presentation: Stable and uncomplicated: LOW COMPLEXITY   CLINICAL DECISION MAKING:   Outcome Measure: Tool Used: Disabilities of the Arm, Shoulder and Hand (DASH) Questionnaire - Quick Version  Score:  Initial: 37/55 or 59% Most Recent: X/55 (Date: -- )   Interpretation of Score: The DASH is designed to measure the activities of daily living in person's with upper extremity dysfunction or pain. Each section is scored on a 1-5 scale, 5 representing the greatest disability. The scores of each section are added together for a total score of 55. Score 11 12-19 20-28 29-37 38-45 46-54 55   Modifier CH CI CJ CK CL CM CN     ? Carrying, Moving, and Handling Objects:     - CURRENT STATUS: CK - 40%-59% impaired, limited or restricted    - GOAL STATUS: CJ - 20%-39% impaired, limited or restricted    - D/C STATUS:  ---------------To be determined---------------      Medical Necessity:   · Skilled intervention continues to be required due to need for modalities. Reason for Services/Other Comments:  · Patient continues to require skilled intervention due to need for progression of ROM and guidance into exercercises. Use of outcome tool(s) and clinical judgement create a POC that gives a: Questionable prediction of patient's progress: MODERATE COMPLEXITY            TREATMENT:   (In addition to Assessment/Re-Assessment sessions the following treatments were rendered)  Pre-treatment Symptoms/Complaints:  Pt reports she has been doing her HEP.    Pain: Initial:     5/10 Post Session:  5-6/10       MANUAL THERAPY: (25 minutes): for ROM. Pt sitting and PROM to physiological mobilizations for wrist flexion, wrist extension, forearm supination/pronation, radial deviation and ulnar deviation grade 2-3. Gentle scar desensitization to incision. HEP: continue current HEP, add scar desensitization     Treatment/Session Assessment:    · Response to Treatment: stiffness with wrist PROM. · Compliance with Program/Exercises: complaint   · Recommendations/Intent for next treatment session: \"Next visit will focus on Manual treatment, Modalities and exercise modification as needed  · \".   Total Treatment Duration: 25 minutes  PT Patient Time In/Time Out  Time In: 6512  Time Out: [facility] 1469

## 2018-06-12 ENCOUNTER — HOSPITAL ENCOUNTER (OUTPATIENT)
Dept: PHYSICAL THERAPY | Age: 58
Discharge: HOME | End: 2018-06-12
Payer: COMMERCIAL

## 2018-06-12 PROCEDURE — 97140 MANUAL THERAPY 1/> REGIONS: CPT

## 2018-06-12 PROCEDURE — 97110 THERAPEUTIC EXERCISES: CPT

## 2018-06-12 NOTE — PROGRESS NOTES
Autumn Brown  : 1960  [facility]: [facility]  Secondary:  Therapy Center at [facility] [address], [address], [place] [address].  [phone]   [phone]        OUTPATIENT PHYSICAL THERAPY:Daily Note 2018 3   ICD-10: Treatment Diagnosis: Pain in right wrist (M25.531)  Precautions/Allergies: no precautions/ NKA   Fall Risk Score: 0 (? 5 = High Risk)  MD Orders: PT-evaluate and treat MEDICAL/REFERRING DIAGNOSIS:  M67.431 Right wrist  DATE OF ONSET: 5/15/18  REFERRING PHYSICIAN: Donny Monte MD  RETURN PHYSICIAN APPOINTMENT: 18     INITIAL ASSESSMENT:  Ms. Arthur Stewart comes to therapy S/P ganglion cyst removal at the right wrist. She presents with decreased wrist and forearm motion; weakness in the wrist, hand and fingers; movement pain and numbness over the dorsum of her wrist and hand. She is not taking the prescribed pain medication due to unavailability at the pharmacy. She uses her hands during the entire shift at work and also needs to care for her home and grand children. Ms. Arthur Stewart will benefit from skilled therapy to address her deficits and assist in the functional return of her dominant hand for independent ADL's and job duties. PROBLEM LIST (Impacting functional limitations):  1. Decreased Strength  2. Decreased ADL/Functional Activities  3. Increased Pain  4. Decreased Activity Tolerance INTERVENTIONS PLANNED:  1. Cold  2. Heat  3. Home Exercise Program (HEP)  4. Manual Therapy  5. Range of Motion (ROM)  6. Therapeutic Exercise/Strengthening  7. Ultrasound (US)   TREATMENT PLAN:  Effective Dates: 2018 TO 8/3/2018 (60 days). Frequency/Duration: 2-3 times a week for 60 Days  GOALS: (Goals have been discussed and agreed upon with patient.)  Discharge Goals: Time Frame:  60 days   Independent with HEP  2. AROM WNL to allow independent ADL's without compensation  3. Strength 5/5 to allow safe lifting      Tolerate activity > 45 minutes for home care  2. ADL's with pain < 2/10    Rehabilitation Potential For Stated Goals: Good  Regarding Doris Sharon Polanco's therapy, I certify that the treatment plan above will be carried out by a therapist or under their direction. Thank you for this referral,  Paula Giles, PT                 The information in this section was collected on 6/4/18 (except where otherwise noted). HISTORY:   History of Present Injury/Illness (Reason for Referral):  Pt reports having the cyst for years but that it began to increase in size and become so painful that she was unable to write or lift with the right hand. Surgery 5-15-18  Past Medical History/Comorbidities:   Ms. Aurora Morelos  has a past medical history of Diabetes. Unremarkable for the right wrist  Social History/Living Environment:     lives with family in apartment. 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 Personal Factors: Other factors that influence how disability is experienced by the patient:  Job requirements, have to drive 30 miles each way to work  Current Medications:       Current Outpatient Prescriptions:    * Gabapentin for shingles    insulin glargine,hum.rec.anlog (BASAGLAR KWIKPEN U-100 INSULIN SC)- may stop due to cost going up to $800     atorvastatin calcium (LIPITOR PO), Take  by mouth nightly., Disp: , Rfl:     METFORMIN 1,000 mg tablet, take 1,000 mg by mouth two (2) times daily (with meals). , Disp: , Rfl:    Date Last Reviewed:  6/12/18   Number of Personal Factors/Comorbidities that affect the Plan of Care: 1-2: MODERATE COMPLEXITY   EXAMINATION:   Observation/Orthostatic Postural Assessment:         Healed incision on the wrist  Palpation:          n/t        ROM: BUE WNL except: Strength: n/t                      Body Structures Involved:  1. Nerves  2. Joints  3. Muscles  4. Ligaments  5. skin Body Functions Affected:  1. Sensory/Pain  2. Movement Related Activities and Participation Affected:  1. General Tasks and Demands  2. Self Care  3. Domestic Life   Number of elements (examined above) that affect the Plan of Care: 3: MODERATE COMPLEXITY   CLINICAL PRESENTATION:   Presentation: Stable and uncomplicated: LOW COMPLEXITY   CLINICAL DECISION MAKING:   Outcome Measure: Tool Used: Disabilities of the Arm, Shoulder and Hand (DASH) Questionnaire - Quick Version  Score:  Initial: 37/55 or 59% Most Recent: X/55 (Date: -- )   Interpretation of Score: The DASH is designed to measure the activities of daily living in person's with upper extremity dysfunction or pain. Each section is scored on a 1-5 scale, 5 representing the greatest disability. The scores of each section are added together for a total score of 55. Score 11 12-19 20-28 29-37 38-45 46-54 55   Modifier CH CI CJ CK CL CM CN     ? Carrying, Moving, and Handling Objects:     - CURRENT STATUS: CK - 40%-59% impaired, limited or restricted    - GOAL STATUS: CJ - 20%-39% impaired, limited or restricted    - D/C STATUS:  ---------------To be determined---------------      Medical Necessity:   · Skilled intervention continues to be required due to need for modalities. Reason for Services/Other Comments:  · Patient continues to require skilled intervention due to need for progression of ROM and guidance into exercercises. Use of outcome tool(s) and clinical judgement create a POC that gives a: Questionable prediction of patient's progress: MODERATE COMPLEXITY            TREATMENT:   (In addition to Assessment/Re-Assessment sessions the following treatments were rendered)  Pre-treatment Symptoms/Complaints:  Pt reports the steri strips are gone and she has been rubbing the incision. Pain: Initial:     5/10 Post Session:  5-6/10     Therapeutic Exercise: (8 Minutes):  Exercises to improve strength and ROM. Pt sitting and wrist flexion, wrist extension, radial deviation, supination/pronation with 1# weight 2x10. Red gripper 3x10. Visual cues for body alignment. MANUAL THERAPY: (15 minutes): for ROM. Pt sitting and PROM to physiological mobilizations for wrist flexion, wrist extension, forearm supination/pronation, radial deviation and ulnar deviation grade 2-3. Gentle scar desensitization to incision. HEP: continue current HEP, add scar desensitization     Treatment/Session Assessment:    · Response to Treatment: Added strengthening exercises today and she did well with no complaints of pain. Less sensitivity to scar today. · Compliance with Program/Exercises: complaint   · Recommendations/Intent for next treatment session: \"Next visit will focus on Manual treatment, Modalities and exercise modification as needed  · \".   Total Treatment Duration: 23 minutes  PT Patient Time In/Time Out  Time In: 7941  Time Out: [facility]

## 2018-06-13 ENCOUNTER — APPOINTMENT (OUTPATIENT)
Dept: PHYSICAL THERAPY | Age: 58
End: 2018-06-13
Payer: COMMERCIAL

## 2018-06-15 ENCOUNTER — HOSPITAL ENCOUNTER (OUTPATIENT)
Dept: PHYSICAL THERAPY | Age: 58
Discharge: HOME | End: 2018-06-15
Payer: COMMERCIAL

## 2018-06-15 PROCEDURE — 97110 THERAPEUTIC EXERCISES: CPT

## 2018-06-15 PROCEDURE — 97140 MANUAL THERAPY 1/> REGIONS: CPT

## 2018-06-15 NOTE — PROGRESS NOTES
Lisa Dyson  : 1960  Primary: [facility]*  Secondary:  [facility] at [address], [address], [place] [address].  [phone]   [phone]        OUTPATIENT PHYSICAL THERAPY:Daily Note 6/15/2018 4   ICD-10: Treatment Diagnosis: Pain in right wrist (M25.531)  Precautions/Allergies: no precautions/ NKA   Fall Risk Score: 0 (? 5 = High Risk)  MD Orders: PT-evaluate and treat MEDICAL/REFERRING DIAGNOSIS:  M67.431 Right wrist  DATE OF ONSET: 5/15/18  REFERRING PHYSICIAN: Yennifer Faye MD  RETURN PHYSICIAN APPOINTMENT: 18     INITIAL ASSESSMENT:  Ms. Vilma Andres comes to therapy S/P ganglion cyst removal at the right wrist. She presents with decreased wrist and forearm motion; weakness in the wrist, hand and fingers; movement pain and numbness over the dorsum of her wrist and hand. She is not taking the prescribed pain medication due to unavailability at the pharmacy. She uses her hands during the entire shift at work and also needs to care for her home and grand children. Ms. Vilma Andres will benefit from skilled therapy to address her deficits and assist in the functional return of her dominant hand for independent ADL's and job duties. PROBLEM LIST (Impacting functional limitations):  1. Decreased Strength  2. Decreased ADL/Functional Activities  3. Increased Pain  4. Decreased Activity Tolerance INTERVENTIONS PLANNED:  1. Cold  2. Heat  3. Home Exercise Program (HEP)  4. Manual Therapy  5. Range of Motion (ROM)  6. Therapeutic Exercise/Strengthening  7. Ultrasound (US)   TREATMENT PLAN:  Effective Dates: 2018 TO 8/3/2018 (60 days). Frequency/Duration: 2-3 times a week for 60 Days  GOALS: (Goals have been discussed and agreed upon with patient.)  Discharge Goals: Time Frame:  60 days     Independent with HEP  2. AROM WNL to allow independent ADL's without compensation  3. Strength 5/5 to allow safe lifting      Tolerate activity > 45 minutes for home care  2. ADL's with pain < 2/10  Rehabilitation Potential For Stated Goals: Good  Regarding Yordy Polanco's therapy, I certify that the treatment plan above will be carried out by a therapist or under their direction. Thank you for this referral,  Yesi Ley PT                 The information in this section was collected on 6/4/18 (except where otherwise noted). HISTORY:   History of Present Injury/Illness (Reason for Referral):  Pt reports having the cyst for years but that it began to increase in size and become so painful that she was unable to write or lift with the right hand. Surgery 5-15-18  Past Medical History/Comorbidities:   Ms. Paula Faust  has a past medical history of Diabetes. Unremarkable for the right wrist  Social History/Living Environment:     lives with family in apartment. 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 Personal Factors: Other factors that influence how disability is experienced by the patient:  Job requirements, have to drive 30 miles each way to work  Current Medications:       Current Outpatient Prescriptions:    * Gabapentin for shingles    insulin glargine,hum.rec.anlog (BASAGLAR KWIKPEN U-100 INSULIN SC)- may stop due to cost going up to $800     atorvastatin calcium (LIPITOR PO), Take  by mouth nightly., Disp: , Rfl:     METFORMIN 1,000 mg tablet, take 1,000 mg by mouth two (2) times daily (with meals). , Disp: , Rfl:    Date Last Reviewed:  6/12/18   Number of Personal Factors/Comorbidities that affect the Plan of Care: 1-2: MODERATE COMPLEXITY   EXAMINATION:   Observation/Orthostatic Postural Assessment:         Healed incision on the wrist  Palpation:          n/t        ROM: BUE WNL except:                                         Strength: n/t                      Body Structures Involved:  1. Nerves  2. Joints  3. Muscles  4. Ligaments  5. skin Body Functions Affected:  1. Sensory/Pain  2. Movement Related Activities and Participation Affected:  1. General Tasks and Demands  2. Self Care  3. Domestic Life   Number of elements (examined above) that affect the Plan of Care: 3: MODERATE COMPLEXITY   CLINICAL PRESENTATION:   Presentation: Stable and uncomplicated: LOW COMPLEXITY   CLINICAL DECISION MAKING:   Outcome Measure: Tool Used: Disabilities of the Arm, Shoulder and Hand (DASH) Questionnaire - Quick Version  Score:  Initial: 37/55 or 59% Most Recent: X/55 (Date: -- )   Interpretation of Score: The DASH is designed to measure the activities of daily living in person's with upper extremity dysfunction or pain. Each section is scored on a 1-5 scale, 5 representing the greatest disability. The scores of each section are added together for a total score of 55. Score 11 12-19 20-28 29-37 38-45 46-54 55   Modifier CH CI CJ CK CL CM CN     ? Carrying, Moving, and Handling Objects:     - CURRENT STATUS: CK - 40%-59% impaired, limited or restricted    - GOAL STATUS: CJ - 20%-39% impaired, limited or restricted    - D/C STATUS:  ---------------To be determined---------------      Medical Necessity:   · Skilled intervention continues to be required due to need for modalities. Reason for Services/Other Comments:  · Patient continues to require skilled intervention due to need for progression of ROM and guidance into exercercises. Use of outcome tool(s) and clinical judgement create a POC that gives a: Questionable prediction of patient's progress: MODERATE COMPLEXITY            TREATMENT:   (In addition to Assessment/Re-Assessment sessions the following treatments were rendered)  Pre-treatment Symptoms/Complaints:  Pt reports she had a little pain in the wrist last night. Pain: Initial:     5/10 Post Session:  5-6/10     Therapeutic Exercise: (10 Minutes):  Exercises to improve strength and ROM. Pt sitting and wrist flexion, wrist extension, radial deviation, supination/pronation with 1# weight 2x10. Red gripper 3x10. Added bicep curls 2# 2x10. Visual cues for body alignment. Added manual stretching to wrist flexion and wrist extension to HEP and she demonstrated each exercise. MANUAL THERAPY: (15 minutes): for ROM. Pt sitting and PROM to physiological mobilizations for wrist flexion, wrist extension, forearm supination/pronation, radial deviation and ulnar deviation grade 2-3. Gentle scar desensitization to incision. HEP: continue current HEP, add scar desensitization     Treatment/Session Assessment:    · Response to Treatment: She continues with stiffness to wrist flexion ROM. No pain with strengthening exercises. · Compliance with Program/Exercises: complaint   · Recommendations/Intent for next treatment session: \"Next visit will focus on Manual treatment, Modalities and exercise modification as needed  · \".   Total Treatment Duration: 25 minutes  PT Patient Time In/Time Out  Time In: 1110  Time Out: [address]

## 2018-06-19 ENCOUNTER — HOSPITAL ENCOUNTER (OUTPATIENT)
Dept: PHYSICAL THERAPY | Age: 58
Discharge: HOME | End: 2018-06-19
Payer: COMMERCIAL

## 2018-06-19 PROCEDURE — 97110 THERAPEUTIC EXERCISES: CPT

## 2018-06-19 PROCEDURE — 97140 MANUAL THERAPY 1/> REGIONS: CPT

## 2018-06-19 PROCEDURE — 97035 APP MDLTY 1+ULTRASOUND EA 15: CPT

## 2018-06-19 NOTE — PROGRESS NOTES
Mike Marion  : 1960  Primary: [facility]*  Secondary:  [facility] at [address], [address], [place] [address].  [phone]   [phone]        OUTPATIENT PHYSICAL THERAPY:Daily Note 2018 5   ICD-10: Treatment Diagnosis: Pain in right wrist (M25.531)  Precautions/Allergies: no precautions/ NKA   Fall Risk Score: 0 (? 5 = High Risk)  MD Orders: PT-evaluate and treat MEDICAL/REFERRING DIAGNOSIS:  M67.431 Right wrist  DATE OF ONSET: 5/15/18  REFERRING PHYSICIAN: Chapo Priest MD  RETURN PHYSICIAN APPOINTMENT: 18     INITIAL ASSESSMENT:  Ms. Kym Booth comes to therapy S/P ganglion cyst removal at the right wrist. She presents with decreased wrist and forearm motion; weakness in the wrist, hand and fingers; movement pain and numbness over the dorsum of her wrist and hand. She is not taking the prescribed pain medication due to unavailability at the pharmacy. She uses her hands during the entire shift at work and also needs to care for her home and grand children. Ms. Kym Booth will benefit from skilled therapy to address her deficits and assist in the functional return of her dominant hand for independent ADL's and job duties. PROBLEM LIST (Impacting functional limitations):  1. Decreased Strength  2. Decreased ADL/Functional Activities  3. Increased Pain  4. Decreased Activity Tolerance INTERVENTIONS PLANNED:  1. Cold  2. Heat  3. Home Exercise Program (HEP)  4. Manual Therapy  5. Range of Motion (ROM)  6. Therapeutic Exercise/Strengthening  7. Ultrasound (US)   TREATMENT PLAN:  Effective Dates: 2018 TO 8/3/2018 (60 days). Frequency/Duration: 2-3 times a week for 60 Days  GOALS: (Goals have been discussed and agreed upon with patient.)  Discharge Goals: Time Frame:  60 days     Independent with HEP- not fully met (18)  2. AROM WNL to allow independent ADL's without compensation  3. Strength 5/5 to allow safe lifting      Tolerate activity > 45 minutes for home care  2. ADL's with pain < 2/10  Rehabilitation Potential For Stated Goals: Good  Regarding Margie Polanco's therapy, I certify that the treatment plan above will be carried out by a therapist or under their direction. Thank you for this referral,  Jo Rubio, PT                 The information in this section was collected on 6/4/18 (except where otherwise noted). HISTORY:   History of Present Injury/Illness (Reason for Referral):  Pt reports having the cyst for years but that it began to increase in size and become so painful that she was unable to write or lift with the right hand. Surgery 5-15-18  Past Medical History/Comorbidities:   Ms. Jennifer Aranda  has a past medical history of Diabetes. Unremarkable for the right wrist  Social History/Living Environment:     lives with family in apartment. 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 Personal Factors: Other factors that influence how disability is experienced by the patient:  Job requirements, have to drive 30 miles each way to work  Current Medications:       Current Outpatient Prescriptions:    * Gabapentin for shingles    insulin glargine,hum.rec.anlog (BASAGLAR KWIKPEN U-100 INSULIN SC)- may stop due to cost going up to $800     atorvastatin calcium (LIPITOR PO), Take  by mouth nightly., Disp: , Rfl:     METFORMIN 1,000 mg tablet, take 1,000 mg by mouth two (2) times daily (with meals). , Disp: , Rfl:    Date Last Reviewed:  6/19/18   Number of Personal Factors/Comorbidities that affect the Plan of Care: 1-2: MODERATE COMPLEXITY   EXAMINATION:   Observation/Orthostatic Postural Assessment:         Healed incision on the wrist except for small scab at mid incision  Palpation:          Sensitive over the incision scar        ROM: BUE WNL except:                RUE AROM  R Forearm Supination: 80  R Wrist Flexion: 53  R Wrist Extension: 53                        Strength: 5/5 right wrist and thumb                      Body Structures Involved:  1. Nerves  2. Joints  3. Muscles  4. Ligaments  5. skin Body Functions Affected:  1. Sensory/Pain  2. Movement Related Activities and Participation Affected:  1. General Tasks and Demands  2. Self Care  3. Domestic Life   Number of elements (examined above) that affect the Plan of Care: 3: MODERATE COMPLEXITY   CLINICAL PRESENTATION:   Presentation: Stable and uncomplicated: LOW COMPLEXITY   CLINICAL DECISION MAKING:   Outcome Measure: Tool Used: Disabilities of the Arm, Shoulder and Hand (DASH) Questionnaire - Quick Version  Score:  Initial: 37/55 or 59% Most Recent: X/55 (Date: -- )   Interpretation of Score: The DASH is designed to measure the activities of daily living in person's with upper extremity dysfunction or pain. Each section is scored on a 1-5 scale, 5 representing the greatest disability. The scores of each section are added together for a total score of 55. Score 11 12-19 20-28 29-37 38-45 46-54 55   Modifier CH CI CJ CK CL CM CN     ? Carrying, Moving, and Handling Objects:     - CURRENT STATUS: CK - 40%-59% impaired, limited or restricted    - GOAL STATUS: CJ - 20%-39% impaired, limited or restricted    - D/C STATUS:  ---------------To be determined---------------      Medical Necessity:   · Skilled intervention continues to be required due to need for modalities. Reason for Services/Other Comments:  · Patient continues to require skilled intervention due to need for progression of ROM and guidance into exercercises.    Use of outcome tool(s) and clinical judgement create a POC that gives a: Questionable prediction of patient's progress: MODERATE COMPLEXITY            TREATMENT:   (In addition to Assessment/Re-Assessment sessions the following treatments were rendered)  Pre-treatment Symptoms/Complaints:  Pt reports she has the most pain in the wrist at night and it keeps her from sleeping. Pain: Initial:      1-7/10 Post Session:  /10     Therapeutic Exercise: (20 Minutes):  Exercises to improve strength and ROM. HEP review with cueing on technique   Date:  6/19/18 Date:   Date:     Activity/Exercise Parameters Parameters Parameters   digiflex Green 2x10 ea finger     Wrist flex/ext 2# x10, 1# x10     Rubber band finger extn 2x10     Supination/pronation x20     Ball squeezes x60sec                      Modalities: (9 minutes) 50% pulsed ultrasound at 1.0w/cm2 for mechanical effects on tissue         MANUAL THERAPY: (10 minutes): for ROM. Grade 4-- to 4- physiological mobilizations for wrist flexion, wrist extension, forearm supination. Gentle scar desensitization to incision using deep prep on a towel. HEP: continue current HEP with stretching and scar desensitization     Treatment/Session Assessment:    · Response to Treatment: improved wrist flexion and extension ROM. No pain with progressing strengthening exercises. · Compliance with Program/Exercises: yes per pt but not exactly sure on how to do them   · Recommendations/Intent for next treatment session: \"Next visit will focus on Manual treatment, Modalities and exercise modification as needed  · \".   Total Treatment Duration:  39   minutes  PT Patient Time In/Time Out  Time In: 0910  Time Out: [address], PT

## 2018-06-19 NOTE — THERAPY EVALUATION
Tomasa Epstein  : 1960  Primary: [facility]*  Secondary:  [facility] at [address], [address] [place] [address],[address] [facility]  [phone]   [phone]        OUTPATIENT PHYSICAL THERAPY:Initial Assessment 2018 1   ICD-10: Treatment Diagnosis: Pain in right wrist (M25.531)  Precautions/Allergies: no precautions/ NKA   Fall Risk Score: 0 (? 5 = High Risk)  MD Orders: PT-evaluate and treat MEDICAL/REFERRING DIAGNOSIS:  M67.431 Right wrist  DATE OF ONSET: 5/15/18  REFERRING PHYSICIAN: Blanca Velasquez MD  RETURN PHYSICIAN APPOINTMENT: 18     INITIAL ASSESSMENT:  Ms. Presley Opitz comes to therapy S/P ganglion cyst removal at the right wrist. She presents with decreased wrist and forearm motion; weakness in the wrist, hand and fingers; movement pain and numbness over the dorsum of her wrist and hand. She is not taking the prescribed pain medication due to unavailability at the pharmacy. She uses her hands during the entire shift at work and also needs to care for her home and grand children. Ms. Presley Opitz will benefit from skilled therapy to address her deficits and assist in the functional return of her dominant hand for independent ADL's and job duties. PROBLEM LIST (Impacting functional limitations):  1. Decreased Strength  2. Decreased ADL/Functional Activities  3. Increased Pain  4. Decreased Activity Tolerance INTERVENTIONS PLANNED:  1. Cold  2. Heat  3. Home Exercise Program (HEP)  4. Manual Therapy  5. Range of Motion (ROM)  6. Therapeutic Exercise/Strengthening  7. Ultrasound (US)   TREATMENT PLAN:  Effective Dates: 2018 TO 8/3/2018 (60 days). Frequency/Duration: 2-3 times a week for 60 Days  GOALS: (Goals have been discussed and agreed upon with patient.)  Discharge Goals: Time Frame:  60 days     Independent with HEP  2. AROM WNL to allow independent ADL's without compensation  3. Strength 5/5 to allow safe lifting      Tolerate activity > 45 minutes for home care  2. ADL's with pain < 2/10  Rehabilitation Potential For Stated Goals: Good  Regarding Doretha Polanco's therapy, I certify that the treatment plan above will be carried out by a therapist or under their direction. Thank you for this referral,  Chana Hill, PT ,MSPT      Referring Physician Signature: Carmen Brown MD              Date                    The information in this section was collected on 6/4/18 (except where otherwise noted). HISTORY:   History of Present Injury/Illness (Reason for Referral):  Pt reports having the cyst for years but that it began to increase in size and become so painful that she was unable to write or lift with the right hand. Past Medical History/Comorbidities:   Ms. Yobany Blum  has a past medical history of Diabetes. Unremarkable for the right wrist  Social History/Living Environment:     lives with family in apartment. 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 Personal Factors: Other factors that influence how disability is experienced by the patient:  Job requirements, have to drive 30 miles each way to work  Current Medications:       Current Outpatient Prescriptions:    * Gabapentin for shingles    insulin glargine,hum.rec.anlog (BASAGLAR KWIKPEN U-100 INSULIN SC)- may stop due to cost going up to $800     atorvastatin calcium (LIPITOR PO), Take  by mouth nightly., Disp: , Rfl:     METFORMIN 1,000 mg tablet, take 1,000 mg by mouth two (2) times daily (with meals). , Disp: , Rfl:    Date Last Reviewed:  6/4/18   Number of Personal Factors/Comorbidities that affect the Plan of Care: 1-2: MODERATE COMPLEXITY   EXAMINATION:   Observation/Orthostatic Postural Assessment:         Steri strips intact covered with Water resistant bandage. No swelling. No redness. Incision healing well   Palpation:          Diminished sensation over the carpal region/ dorsum of the right hand . ROM: BUE WNL except:                RUE AROM  R Forearm Supination: 75  R Wrist Flexion: 14  R Wrist Extension: 37                        Strength: lumbricals 4-, dorsal interossei 4-, thumb 5/5       RUE Strength  R Forearm Supination: 4+  R Wrist Flexion: 4-  R Wrist Extension: 3+ (observed)              Body Structures Involved:  1. Nerves  2. Joints  3. Muscles  4. Ligaments  5. skin Body Functions Affected:  1. Sensory/Pain  2. Movement Related Activities and Participation Affected:  1. General Tasks and Demands  2. Self Care  3. Domestic Life   Number of elements (examined above) that affect the Plan of Care: 3: MODERATE COMPLEXITY   CLINICAL PRESENTATION:   Presentation: Stable and uncomplicated: LOW COMPLEXITY   CLINICAL DECISION MAKING:   Outcome Measure: Tool Used: Disabilities of the Arm, Shoulder and Hand (DASH) Questionnaire - Quick Version  Score:  Initial: 37/55 or 59% Most Recent: X/55 (Date: -- )   Interpretation of Score: The DASH is designed to measure the activities of daily living in person's with upper extremity dysfunction or pain. Each section is scored on a 1-5 scale, 5 representing the greatest disability. The scores of each section are added together for a total score of 55. Score 11 12-19 20-28 29-37 38-45 46-54 55   Modifier CH CI CJ CK CL CM CN     ? Carrying, Moving, and Handling Objects:     - CURRENT STATUS: CK - 40%-59% impaired, limited or restricted    - GOAL STATUS: CJ - 20%-39% impaired, limited or restricted    - D/C STATUS:  ---------------To be determined---------------      Medical Necessity:   · Skilled intervention continues to be required due to need for modalities.   Reason for Services/Other Comments:  · Patient continues to require skilled intervention due to need for progression of ROM and guidance into exercercises. Use of outcome tool(s) and clinical judgement create a POC that gives a: Questionable prediction of patient's progress: MODERATE COMPLEXITY            TREATMENT:   (In addition to Assessment/Re-Assessment sessions the following treatments were rendered)  Pre-treatment Symptoms/Complaints:  Pt complains of numbness over the top of her hand, weakness in her fingers and pain with movement. Pain: Initial:     7/10 Post Session:  5-6/10     THERAPEUTIC EXERCISE: (10 minutes):  Exercises to improve mobility and strength. Required moderate verbal and manual cues to promote proper body posture and exercise technique. Reviewed and issued HEP with wrist flex/extn, ball squeezes and finger extension with small rubber band. Copy issued  MANUAL THERAPY: (5 minutes minutes): for ROM. PROM to right wrist flexion and extension with respect to pain  [employer] [facility]  Treatment/Session Assessment:    · Response to Treatment:  Patient fearful of \"splitting it back open\". Good return demonstration of AROM exercises  · Compliance with Program/Exercises: Will assess as treatment progresses. · Recommendations/Intent for next treatment session: \"Next visit will focus on Manual treatment, Modalities and exercise modification as needed  · \".   Total Treatment Duration: 55 minutes  PT Patient Time In/Time Out  Time In: 0900  Time Out: [address], PT

## 2018-06-20 ENCOUNTER — HOSPITAL ENCOUNTER (OUTPATIENT)
Dept: PHYSICAL THERAPY | Age: 58
Discharge: HOME | End: 2018-06-20
Payer: COMMERCIAL

## 2018-06-20 PROCEDURE — 97035 APP MDLTY 1+ULTRASOUND EA 15: CPT

## 2018-06-20 PROCEDURE — 97140 MANUAL THERAPY 1/> REGIONS: CPT

## 2018-06-20 PROCEDURE — 97110 THERAPEUTIC EXERCISES: CPT

## 2018-06-20 NOTE — PROGRESS NOTES
Zoya Treviño  : 1960  Primary: [facility]*  Secondary:  Therapy Center at [facility] [address], [address], [address] [facility]  [phone]   [phone]        OUTPATIENT PHYSICAL THERAPY:Daily Note 2018 6   ICD-10: Treatment Diagnosis: Pain in right wrist (M25.531)  Precautions/Allergies: no precautions/ NKA   Fall Risk Score: 0 (? 5 = High Risk)  MD Orders: PT-evaluate and treat MEDICAL/REFERRING DIAGNOSIS:  M67.431 Right wrist  DATE OF ONSET: 5/15/18  REFERRING PHYSICIAN: Bismark Ponce MD  RETURN PHYSICIAN APPOINTMENT: 18     INITIAL ASSESSMENT:  Ms. Vani Madera comes to therapy S/P ganglion cyst removal at the right wrist. She presents with decreased wrist and forearm motion; weakness in the wrist, hand and fingers; movement pain and numbness over the dorsum of her wrist and hand. She is not taking the prescribed pain medication due to unavailability at the pharmacy. She uses her hands during the entire shift at work and also needs to care for her home and grand children. Ms. Vani Madera will benefit from skilled therapy to address her deficits and assist in the functional return of her dominant hand for independent ADL's and job duties. PROBLEM LIST (Impacting functional limitations):  1. Decreased Strength  2. Decreased ADL/Functional Activities  3. Increased Pain  4. Decreased Activity Tolerance INTERVENTIONS PLANNED:  1. Cold  2. Heat  3. Home Exercise Program (HEP)  4. Manual Therapy  5. Range of Motion (ROM)  6. Therapeutic Exercise/Strengthening  7. Ultrasound (US)   TREATMENT PLAN:  Effective Dates: 2018 TO 8/3/2018 (60 days). Frequency/Duration: 2-3 times a week for 60 Days  GOALS: (Goals have been discussed and agreed upon with patient.)  Discharge Goals: Time Frame:  60 days     Independent with HEP- not fully met (18)  2. AROM WNL to allow independent ADL's without compensation  3. Strength 5/5 to allow safe lifting      Tolerate activity > 45 minutes for home care  2. ADL's with pain < 2/10  Rehabilitation Potential For Stated Goals: Good  Regarding Belén Polanco's therapy, I certify that the treatment plan above will be carried out by a therapist or under their direction. Thank you for this referral,  Graciela Terry PT                 The information in this section was collected on 6/4/18 (except where otherwise noted). HISTORY:   History of Present Injury/Illness (Reason for Referral):  Pt reports having the cyst for years but that it began to increase in size and become so painful that she was unable to write or lift with the right hand. Surgery 5-15-18  Past Medical History/Comorbidities:   Ms. Radha Hernandez  has a past medical history of Diabetes. Unremarkable for the right wrist  Social History/Living Environment:     lives with family in apartment. 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 Personal Factors: Other factors that influence how disability is experienced by the patient:  Job requirements, have to drive 30 miles each way to work  Current Medications:       Current Outpatient Prescriptions:    * Gabapentin for shingles    insulin glargine,hum.rec.anlog (BASAGLAR KWIKPEN U-100 INSULIN SC)- may stop due to cost going up to $800     atorvastatin calcium (LIPITOR PO), Take  by mouth nightly., Disp: , Rfl:     METFORMIN 1,000 mg tablet, take 1,000 mg by mouth two (2) times daily (with meals). , Disp: , Rfl:    Date Last Reviewed:  6/19/18   Number of Personal Factors/Comorbidities that affect the Plan of Care: 1-2: MODERATE COMPLEXITY   EXAMINATION:   Observation/Orthostatic Postural Assessment:         Healed incision on the wrist  Palpation:          No pain ROM:                RUE AROM  R Forearm Supination: 83  R Wrist Flexion: 69  R Wrist Extension: 67                        Strength: n/t                      Body Structures Involved:  1. Nerves  2. Joints  3. Muscles  4. Ligaments  5. skin Body Functions Affected:  1. Sensory/Pain  2. Movement Related Activities and Participation Affected:  1. General Tasks and Demands  2. Self Care  3. Domestic Life   Number of elements (examined above) that affect the Plan of Care: 3: MODERATE COMPLEXITY   CLINICAL PRESENTATION:   Presentation: Stable and uncomplicated: LOW COMPLEXITY   CLINICAL DECISION MAKING:   Outcome Measure: Tool Used: Disabilities of the Arm, Shoulder and Hand (DASH) Questionnaire - Quick Version  Score:  Initial: 37/55 or 59% Most Recent: X/55 (Date: -- )   Interpretation of Score: The DASH is designed to measure the activities of daily living in person's with upper extremity dysfunction or pain. Each section is scored on a 1-5 scale, 5 representing the greatest disability. The scores of each section are added together for a total score of 55. Score 11 12-19 20-28 29-37 38-45 46-54 55   Modifier CH CI CJ CK CL CM CN     ? Carrying, Moving, and Handling Objects:     - CURRENT STATUS: CK - 40%-59% impaired, limited or restricted    - GOAL STATUS: CJ - 20%-39% impaired, limited or restricted    - D/C STATUS:  ---------------To be determined---------------      Medical Necessity:   · Skilled intervention continues to be required due to need for modalities. Reason for Services/Other Comments:  · Patient continues to require skilled intervention due to need for progression of ROM and guidance into exercercises.    Use of outcome tool(s) and clinical judgement create a POC that gives a: Questionable prediction of patient's progress: MODERATE COMPLEXITY            TREATMENT:   (In addition to Assessment/Re-Assessment sessions the following treatments were rendered)  Pre-treatment Symptoms/Complaints:  Pt reports she has the most pain in the wrist at night and it keeps her from sleeping. Pain: Initial:      1-2/10 Post Session: 0-1 /10     Therapeutic Exercise: (10 Minutes):  Exercises to improve strength and ROM. HEP review with cueing on technique   Date:  6/19/18 Date:  6/20/18 Date:     Activity/Exercise Parameters Parameters Parameters   digiflex Green 2x10 ea finger Green x 10  Red x10    Wrist flex/ext 2# x10, 1# x10 2# 2x12    Rubber band finger extn 2x10     Supination/pronation x20 2x12 1#    Ball squeezes x60sec                      Modalities: (9 minutes) 50% pulsed ultrasound at 1.0w/cm2 for mechanical effects on tissue prior to exercises         MANUAL THERAPY: (20 minutes): for ROM. Grade 4-- to 4 physiological mobilizations for wrist flexion, wrist extension, forearm supination. HEP: continue current HEP with stretching      Treatment/Session Assessment:    · Response to Treatment: improved wrist flexion, extension and supination ROM. Flexion and supination equal to the left. No pain with  exercises. · Compliance with Program/Exercises: yes per pt but not exactly sure on how to do them   · Recommendations/Intent for next treatment session: \"Next visit will focus on Manual treatment, Modalities and exercise modification as needed  · \".   Total Treatment Duration:  39   minutes  PT Patient Time In/Time Out  Time In: 1115  Time Out: 1431 [facility],

## 2018-06-22 ENCOUNTER — HOSPITAL ENCOUNTER (OUTPATIENT)
Dept: PHYSICAL THERAPY | Age: 58
Discharge: HOME | End: 2018-06-22
Payer: COMMERCIAL

## 2018-06-22 PROCEDURE — 97110 THERAPEUTIC EXERCISES: CPT

## 2018-06-22 PROCEDURE — 97035 APP MDLTY 1+ULTRASOUND EA 15: CPT

## 2018-06-22 PROCEDURE — 97140 MANUAL THERAPY 1/> REGIONS: CPT

## 2018-06-22 NOTE — PROGRESS NOTES
Lisa Dyson  : 1960  Primary: [facility]*  Secondary:  Therapy Center at [address], [address], [place] [address].  [phone]   [phone]        OUTPATIENT PHYSICAL THERAPY:Daily Note 2018 7   ICD-10: Treatment Diagnosis: Pain in right wrist (M25.531)  Precautions/Allergies: no precautions/ NKA   Fall Risk Score: 0 (? 5 = High Risk)  MD Orders: PT-evaluate and treat MEDICAL/REFERRING DIAGNOSIS:  M67.431 Right wrist  DATE OF ONSET: 5/15/18  REFERRING PHYSICIAN: Yennifer Faye MD  RETURN PHYSICIAN APPOINTMENT: 18     INITIAL ASSESSMENT:  Ms. Vilma Andres comes to therapy S/P ganglion cyst removal at the right wrist. She presents with decreased wrist and forearm motion; weakness in the wrist, hand and fingers; movement pain and numbness over the dorsum of her wrist and hand. She is not taking the prescribed pain medication due to unavailability at the pharmacy. She uses her hands during the entire shift at work and also needs to care for her home and grand children. Ms. Vilma Andres will benefit from skilled therapy to address her deficits and assist in the functional return of her dominant hand for independent ADL's and job duties. PROBLEM LIST (Impacting functional limitations):  1. Decreased Strength  2. Decreased ADL/Functional Activities  3. Increased Pain  4. Decreased Activity Tolerance INTERVENTIONS PLANNED:  1. Cold  2. Heat  3. Home Exercise Program (HEP)  4. Manual Therapy  5. Range of Motion (ROM)  6. Therapeutic Exercise/Strengthening  7. Ultrasound (US)   TREATMENT PLAN:  Effective Dates: 2018 TO 8/3/2018 (60 days). Frequency/Duration: 2-3 times a week for 60 Days  GOALS: (Goals have been discussed and agreed upon with patient.)  Discharge Goals: Time Frame:  60 days     Independent with HEP- not fully met (18)  2. AROM WNL to allow independent ADL's without compensation  3. Strength 5/5 to allow safe lifting      Tolerate activity > 45 minutes for home care  2. ADL's with pain < 2/10  Rehabilitation Potential For Stated Goals: Good  Regarding Marin Polanco's therapy, I certify that the treatment plan above will be carried out by a therapist or under their direction. Thank you for this referral,  Justine Mills PT                 The information in this section was collected on 6/4/18 (except where otherwise noted). HISTORY:   History of Present Injury/Illness (Reason for Referral):  Pt reports having the cyst for years but that it began to increase in size and become so painful that she was unable to write or lift with the right hand. Surgery 5-15-18  Past Medical History/Comorbidities:   Ms. Mickie Ortega  has a past medical history of Diabetes. Unremarkable for the right wrist  Social History/Living Environment:     lives with family in apartment. 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 Personal Factors: Other factors that influence how disability is experienced by the patient:  Job requirements, have to drive 30 miles each way to work  Current Medications:       Current Outpatient Prescriptions:    * Gabapentin for shingles    insulin glargine,hum.rec.anlog (BASAGLAR KWIKPEN U-100 INSULIN SC)- may stop due to cost going up to $800     atorvastatin calcium (LIPITOR PO), Take  by mouth nightly., Disp: , Rfl:     METFORMIN 1,000 mg tablet, take 1,000 mg by mouth two (2) times daily (with meals). , Disp: , Rfl:    Date Last Reviewed:  6/19/18   Number of Personal Factors/Comorbidities that affect the Plan of Care: 1-2: MODERATE COMPLEXITY   EXAMINATION:   Observation/Orthostatic Postural Assessment:         Healed incision on the wrist. Fluent movements  Palpation: Tender with pressure over the incision         ROM:                RUE AROM  R Forearm Supination: 90  R Wrist Flexion: 71 (73 after treatment)  R Wrist Extension: 74                        Strength: n/t                      Body Structures Involved:  1. Nerves  2. Joints  3. Muscles  4. Ligaments  5. skin Body Functions Affected:  1. Sensory/Pain  2. Movement Related Activities and Participation Affected:  1. General Tasks and Demands  2. Self Care  3. Domestic Life   Number of elements (examined above) that affect the Plan of Care: 3: MODERATE COMPLEXITY   CLINICAL PRESENTATION:   Presentation: Stable and uncomplicated: LOW COMPLEXITY   CLINICAL DECISION MAKING:   Outcome Measure: Tool Used: Disabilities of the Arm, Shoulder and Hand (DASH) Questionnaire - Quick Version  Score:  Initial: 37/55 or 59% Most Recent: X/55 (Date: -- )   Interpretation of Score: The DASH is designed to measure the activities of daily living in person's with upper extremity dysfunction or pain. Each section is scored on a 1-5 scale, 5 representing the greatest disability. The scores of each section are added together for a total score of 55. Score 11 12-19 20-28 29-37 38-45 46-54 55   Modifier CH CI CJ CK CL CM CN     ? Carrying, Moving, and Handling Objects:     - CURRENT STATUS: CK - 40%-59% impaired, limited or restricted    - GOAL STATUS: CJ - 20%-39% impaired, limited or restricted    - D/C STATUS:  ---------------To be determined---------------      Medical Necessity:   · Skilled intervention continues to be required due to need for modalities. Reason for Services/Other Comments:  · Patient continues to require skilled intervention due to need for progression of ROM and guidance into exercercises.    Use of outcome tool(s) and clinical judgement create a POC that gives a: Questionable prediction of patient's progress: MODERATE COMPLEXITY            TREATMENT:   (In addition to Assessment/Re-Assessment sessions the following treatments were rendered)  Pre-treatment Symptoms/Complaints:  Pt reports the wrist no longer keeps her from sleeping. Having to lift and carry boxes at work, tear open boxes  Pain: Initial:      1/10 Post Session: 0-1 /10     Therapeutic Exercise: (26 Minutes):  Exercises to improve strength and ROM. HEP review with cueing on technique   Date:  6/19/18 Date:  6/20/18 Date:  6/22/18   Activity/Exercise Parameters Parameters Parameters   digiflex Green 2x10 ea finger Green x 10  Red x10 Green x 10  Red x15   Wrist flex/ext 2# x10, 1# x10 2# 2x12 2# 2x15   Rubber band finger extn 2x10  2x15   Supination/pronation x20 2x12 1# 2x15   Ball squeezes x60sec   x20 reps   Bicep curls   2# B 2x10   Scapular retraction   Yellow 2x10   protraction   Yellow 2x10             Modalities: (9 minutes) 50% pulsed ultrasound at 1.0w/cm2 for mechanical effects on tissue prior to doing the exercises         MANUAL THERAPY: (10 minutes): for ROM. Grade 4- physiological mobilizations right wrist flexion and extn      HEP: continue current HEP      Treatment/Session Assessment:    · Response to Treatment: improved wrist motion. Flexion still stiff. Added shoulder exercises to prepare for work. No pain with progessing exercises. · Compliance with Program/Exercises: yes per pt but not exactly sure on how to do them   · Recommendations/Intent for next treatment session: \"Next visit will focus on Manual treatment, Modalities and exercise modification as needed  · \".   Total Treatment Duration:  45   minutes  PT Patient Time In/Time Out  Time In: 1110  Time Out: 865 [facility],

## 2018-06-25 ENCOUNTER — HOSPITAL ENCOUNTER (OUTPATIENT)
Dept: PHYSICAL THERAPY | Age: 58
Discharge: HOME | End: 2018-06-25
Payer: COMMERCIAL

## 2018-06-25 PROCEDURE — 97110 THERAPEUTIC EXERCISES: CPT

## 2018-06-25 PROCEDURE — 97035 APP MDLTY 1+ULTRASOUND EA 15: CPT

## 2018-06-25 NOTE — PROGRESS NOTES
Rupinder Maloney  : 1960  Primary: [facility]*  Secondary:  Therapy Center at [address] [place] [address] Rd  [address] [facility], [address] [place] [address] [address], [address] [facility]  [phone]   [phone]      Outpatient PHYSICAL THERAPY: PHYSICIAN COMMUNICATION    REFERRING PHYSICIAN: Tony Rowell MD  Return Physician Appointment: 18  MEDICAL/REFERRING DIAGNOSIS:  · Z24.887  ATTENDANCE: Rupinder Maloney has attended 8 out of 8 visits. ASSESSMENT:  DATE: 2018    PROGRESS: Rupinder Maloney has AROM WFL and equal to the left. Strength has improved to WNL except for wrist flexion 4+/5. Pain comes with using the right hand for lifting.  Therapy focus has been on ultrasound followed by manual stretching and then strengthening exercise of the entire BUE.   ROM:                 RUE AROM  R Forearm Supination: 90  R Wrist Flexion: 76  R Wrist Extension: 74           RUE 5/5 except for wrist flexion 4+/5    Thank you for this referral,  Isabel Acosta, PT,MSPT

## 2018-06-25 NOTE — PROGRESS NOTES
Jessica Bryan  : 1960  Primary: [facility]*  Secondary:  Therapy Center at [facility] [address], [address], [place] [address].  [phone]   [phone]        OUTPATIENT PHYSICAL THERAPY:Daily Note 2018 8   ICD-10: Treatment Diagnosis: Pain in right wrist (M25.531)  Precautions/Allergies: no precautions/ NKA   Fall Risk Score: 0 (? 5 = High Risk)  MD Orders: PT-evaluate and treat MEDICAL/REFERRING DIAGNOSIS:  M67.431 Right wrist  DATE OF ONSET: 5/15/18  REFERRING PHYSICIAN: Kilo Nash MD  RETURN PHYSICIAN APPOINTMENT: 18     INITIAL ASSESSMENT:  Ms. Aurora Morelos comes to therapy S/P ganglion cyst removal at the right wrist. She presents with decreased wrist and forearm motion; weakness in the wrist, hand and fingers; movement pain and numbness over the dorsum of her wrist and hand. She is not taking the prescribed pain medication due to unavailability at the pharmacy. She uses her hands during the entire shift at work and also needs to care for her home and grand children. Ms. Aurora Morelos will benefit from skilled therapy to address her deficits and assist in the functional return of her dominant hand for independent ADL's and job duties. PROBLEM LIST (Impacting functional limitations):  1. Decreased Strength  2. Decreased ADL/Functional Activities  3. Increased Pain  4. Decreased Activity Tolerance INTERVENTIONS PLANNED:  1. Cold  2. Heat  3. Home Exercise Program (HEP)  4. Manual Therapy  5. Range of Motion (ROM)  6. Therapeutic Exercise/Strengthening  7. Ultrasound (US)   TREATMENT PLAN:  Effective Dates: 2018 TO 8/3/2018 (60 days). Frequency/Duration: 2-3 times a week for 60 Days  GOALS: (Goals have been discussed and agreed upon with patient.)  Discharge Goals: Time Frame:  60 days     Independent with HEP- not fully met (18)  2. AROM WNL to allow independent ADL's without compensation  3. Strength 5/5 to allow safe lifting      Tolerate activity > 45 minutes for home care  2. ADL's with pain < 2/10  Rehabilitation Potential For Stated Goals: Good  Regarding Yair Polanco's therapy, I certify that the treatment plan above will be carried out by a therapist or under their direction. Thank you for this referral,  Julianne Ortiz, PT                 The information in this section was collected on 6/4/18 (except where otherwise noted). HISTORY:   History of Present Injury/Illness (Reason for Referral):  Pt reports having the cyst for years but that it began to increase in size and become so painful that she was unable to write or lift with the right hand. Surgery 5-15-18  Past Medical History/Comorbidities:   Ms. Matty Marks  has a past medical history of Diabetes. Unremarkable for the right wrist  Social History/Living Environment:     lives with family in apartment. 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 Personal Factors: Other factors that influence how disability is experienced by the patient:  Job requirements, have to drive 30 miles each way to work  Current Medications:       Current Outpatient Prescriptions:    * Gabapentin for shingles    insulin glargine,hum.rec.anlog (BASAGLAR KWIKPEN U-100 INSULIN SC)- may stop due to cost going up to $800     atorvastatin calcium (LIPITOR PO), Take  by mouth nightly., Disp: , Rfl:     METFORMIN 1,000 mg tablet, take 1,000 mg by mouth two (2) times daily (with meals). , Disp: , Rfl:    Date Last Reviewed:  6/25/18   Number of Personal Factors/Comorbidities that affect the Plan of Care: 1-2: MODERATE COMPLEXITY   EXAMINATION:   Observation/Orthostatic Postural Assessment:        No swelling or redness.  Fluent movements  Palpation: Tender with pressure over the incision         ROM:                RUE AROM  R Forearm Supination: 90  R Wrist Flexion: 76  R Wrist Extension: 74                        Strength: n/t                      Body Structures Involved:  1. Nerves  2. Joints  3. Muscles  4. Ligaments  5. skin Body Functions Affected:  1. Sensory/Pain  2. Movement Related Activities and Participation Affected:  1. General Tasks and Demands  2. Self Care  3. Domestic Life   Number of elements (examined above) that affect the Plan of Care: 3: MODERATE COMPLEXITY   CLINICAL PRESENTATION:   Presentation: Stable and uncomplicated: LOW COMPLEXITY   CLINICAL DECISION MAKING:   Outcome Measure: Tool Used: Disabilities of the Arm, Shoulder and Hand (DASH) Questionnaire - Quick Version  Score:  Initial: 37/55 or 59% Most Recent: X/55 (Date: -- )   Interpretation of Score: The DASH is designed to measure the activities of daily living in person's with upper extremity dysfunction or pain. Each section is scored on a 1-5 scale, 5 representing the greatest disability. The scores of each section are added together for a total score of 55. Score 11 12-19 20-28 29-37 38-45 46-54 55   Modifier CH CI CJ CK CL CM CN     ? Carrying, Moving, and Handling Objects:     - CURRENT STATUS: CK - 40%-59% impaired, limited or restricted    - GOAL STATUS: CJ - 20%-39% impaired, limited or restricted    - D/C STATUS:  ---------------To be determined---------------      Medical Necessity:   · Skilled intervention continues to be required due to need for modalities. Reason for Services/Other Comments:  · Patient continues to require skilled intervention due to need for progression of ROM and guidance into exercercises.    Use of outcome tool(s) and clinical judgement create a POC that gives a: Questionable prediction of patient's progress: MODERATE COMPLEXITY            TREATMENT:     (In addition to Assessment/Re-Assessment sessions the following treatments were rendered)  Pre-treatment Symptoms/Complaints:  Pt reports the wrist not hurting until she picked up a fruit tray yesterday. Pain: Initial:      2-4/10 Post Session: 0 /10     Therapeutic Exercise: (28 Minutes):  Exercises to improve strength and ROM. moderate cueing for technique   Date:  6/19/18 Date:  6/20/18 Date:  6/22/18 6/25/18    Activity/Exercise Parameters Parameters Parameters     digiflex Green 2x10 ea finger Green x 10  Red x10 Green x 10  Red x15 Green x12, x10    Wrist flex/ext 2# x10, 1# x10 2# 2x12 2# 2x15 2# 2x15    Rubber band finger extn 2x10  2x15 2x15    Supination/pronation x20 2x12 1# 2x15 Red flex bar 2x15    Ball squeezes x60sec   x20 reps x20    Bicep curls   2# B 2x10 3# B 2x10    Scapular retraction   Yellow 2x10 Red 2x10    protraction   Yellow 2x10 Red 2x10                Modalities: (9 minutes) 50% pulsed ultrasound at 1.0 w/cm2 for mechanical effects on tissue prior to doing the exercises         MANUAL THERAPY: (5 minutes): for ROM. Grade 4- physiological mobilizations right wrist flexion      HEP: continue current HEP. Pt agreed      Treatment/Session Assessment:    · Response to Treatment:  Flexion still stiff but responds well to treatment. Good technique with  exercises. Less manual treatment needed  · Compliance with Program/Exercises:  Some, per pt   · Recommendations/Intent for next treatment session: \"Next visit will focus on Manual treatment, Modalities and exercise modification as needed. To MD  · \".   Total Treatment Duration:  42   minutes  PT Patient Time In/Time Out  Time In: 1115  Time Out: 6475 [facility],

## 2018-06-27 ENCOUNTER — HOSPITAL ENCOUNTER (OUTPATIENT)
Dept: PHYSICAL THERAPY | Age: 58
Discharge: HOME | End: 2018-06-27
Payer: COMMERCIAL

## 2018-06-27 NOTE — PROGRESS NOTES
Damari Barros  : 1960  Primary: [facility]*  Secondary:  [facility], [address] [place] [address],[address], [place] [address].  [phone]   [phone]        OUTPATIENT PHYSICAL THERAPY:cancellation 2018 9   ICD-10: Treatment Diagnosis: Pain in right wrist (M25.531)  Precautions/Allergies: no precautions/ NKA   Fall Risk Score: 0 (? 5 = High Risk)  MD Orders: PT-evaluate and treat MEDICAL/REFERRING DIAGNOSIS:  M67.431 Right wrist  DATE OF ONSET: 5/15/18  REFERRING PHYSICIAN: Ian Romero MD  RETURN PHYSICIAN APPOINTMENT: 18     INITIAL ASSESSMENT:  Ms. Alma Wills comes to therapy S/P ganglion cyst removal at the right wrist. She presents with decreased wrist and forearm motion; weakness in the wrist, hand and fingers; movement pain and numbness over the dorsum of her wrist and hand. She is not taking the prescribed pain medication due to unavailability at the pharmacy. She uses her hands during the entire shift at work and also needs to care for her home and grand children. Ms. Alma Wills will benefit from skilled therapy to address her deficits and assist in the functional return of her dominant hand for independent ADL's and job duties. PROBLEM LIST (Impacting functional limitations):  1. Decreased Strength  2. Decreased ADL/Functional Activities  3. Increased Pain  4. Decreased Activity Tolerance INTERVENTIONS PLANNED:  1. Cold  2. Heat  3. Home Exercise Program (HEP)  4. Manual Therapy  5. Range of Motion (ROM)  6. Therapeutic Exercise/Strengthening  7. Ultrasound (US)   TREATMENT PLAN:  Effective Dates: 2018 TO 8/3/2018 (60 days). Frequency/Duration: 2-3 times a week for 60 Days  GOALS: (Goals have been discussed and agreed upon with patient.)  Discharge Goals: Time Frame:  60 days     Independent with HEP- not fully met (18)  2. AROM WNL to allow independent ADL's without compensation  3. Strength 5/5 to allow safe lifting      Tolerate activity > 45 minutes for home care  2. ADL's with pain < 2/10  Rehabilitation Potential For Stated Goals: Good  Regarding Reyna Polanco's therapy, I certify that the treatment plan above will be carried out by a therapist or under their direction.   Thank you for this referral,  Angel Green, PT        Pt called to cancel appointment without a reason

## 2018-06-29 ENCOUNTER — HOSPITAL ENCOUNTER (OUTPATIENT)
Dept: PHYSICAL THERAPY | Age: 58
Discharge: HOME | End: 2018-06-29
Payer: COMMERCIAL

## 2018-06-29 PROCEDURE — 97035 APP MDLTY 1+ULTRASOUND EA 15: CPT

## 2018-06-29 PROCEDURE — 97140 MANUAL THERAPY 1/> REGIONS: CPT

## 2018-06-29 NOTE — PROGRESS NOTES
Francesca Lewis  : 1960  Primary: [facility]*  Secondary:  Therapy Center at [facility]  [address] [facility], [address] [place] [address],[address], [place] [address].  [phone]   [phone]        OUTPATIENT PHYSICAL THERAPY:Daily Note and Discharge 2018 9   ICD-10: Treatment Diagnosis: Pain in right wrist (M25.531)  Precautions/Allergies: no precautions/ NKA   Fall Risk Score: 0 (? 5 = High Risk)  MD Orders: PT-evaluate and treat MEDICAL/REFERRING DIAGNOSIS:  M67.431 Right wrist  DATE OF ONSET: 5/15/18  REFERRING PHYSICIAN: Leonidas Mejia MD  RETURN PHYSICIAN APPOINTMENT: 18      ASSESSMENT:  Ms. Catherine Rangel came to therapy S/P ganglion cyst removal at the right wrist. She presents today with normal motion that is equal to the left, strength is 5/5 and pain free. Goals have been met. PROBLEM LIST (Impacting functional limitations):  1. Decreased Strength  2. Decreased ADL/Functional Activities  3. Increased Pain  4. Decreased Activity Tolerance INTERVENTIONS PLANNED:  1. Cold  2. Heat  3. Home Exercise Program (HEP)  4. Manual Therapy  5. Range of Motion (ROM)  6. Therapeutic Exercise/Strengthening  7. Ultrasound (US)   TREATMENT PLAN:  Discharge with HEP  GOALS: (Goals have been discussed and agreed upon with patient.)  Discharge Goals: Time Frame:  60 days     Independent with HEP- met  2. AROM WNL to allow independent ADL's without compensation- met  3. Strength 5/5 to allow safe lifting -met     Tolerate activity > 45 minutes for home care-met  2. ADL's with pain < 2/10-met                      The information in this section was collected on 18 (except where otherwise noted). HISTORY:   History of Present Injury/Illness (Reason for Referral):  Pt reports having the cyst for years but that it began to increase in size and become so painful that she was unable to write or lift with the right hand.  Surgery 5-15-18  Past Medical History/Comorbidities:   Ms. Catherine Rangel has a past medical history of Diabetes. Unremarkable for the right wrist  Social History/Living Environment:     lives with family in apartment. 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 Personal Factors: Other factors that influence how disability is experienced by the patient:  Job requirements, have to drive 30 miles each way to work  Current Medications:       Current Outpatient Prescriptions:    * Gabapentin for shingles    insulin glargine,hum.rec.anlog (BASAGLAR KWIKPEN U-100 INSULIN SC)- may stop due to cost going up to $800     atorvastatin calcium (LIPITOR PO), Take  by mouth nightly., Disp: , Rfl:     METFORMIN 1,000 mg tablet, take 1,000 mg by mouth two (2) times daily (with meals). , Disp: , Rfl:    Date Last Reviewed:  6/25/18   Number of Personal Factors/Comorbidities that affect the Plan of Care: 1-2: MODERATE COMPLEXITY   EXAMINATION:   Observation/Orthostatic Postural Assessment:         Fluent movements. Good lifting technique  Palpation:          Tender only with deep pressure over the incision         ROM:                RUE AROM  R Forearm Supination: 90  R Wrist Flexion: 79  R Wrist Extension: 80                        Strength: 5/5                      Body Structures Involved:  1. Nerves  2. Joints  3. Muscles  4. Ligaments  5. skin Body Functions Affected:  1. Sensory/Pain  2. Movement Related Activities and Participation Affected:  1. General Tasks and Demands  2. Self Care  3. Domestic Life   Number of elements (examined above) that affect the Plan of Care: 3: MODERATE COMPLEXITY   CLINICAL PRESENTATION:   Presentation: Stable and uncomplicated: LOW COMPLEXITY   CLINICAL DECISION MAKING:   Outcome Measure:    Tool Used: Disabilities of the Arm, Shoulder and Hand (DASH) Questionnaire - Quick Version  Score:  Initial: 37/55 or 59% Most Recent: 7/55 or 5% (Date: 6/29/18 )   Interpretation of Score: The DASH is designed to measure the activities of daily living in person's with upper extremity dysfunction or pain. Each section is scored on a 1-5 scale, 5 representing the greatest disability. The scores of each section are added together for a total score of 55. Score 11 12-19 20-28 29-37 38-45 46-54 55   Modifier CH CI CJ CK CL CM CN     ? Carrying, Moving, and Handling Objects:     - CURRENT STATUS:    - GOAL STATUS: CJ - 20%-39% impaired, limited or restricted    - D/C STATUS:  CI - 1%-19% impaired, limited or restricted      Medical Necessity:   · Skilled intervention continues to be required due to need for modalities. Reason for Services/Other Comments:  · Patient continues to require skilled intervention due to need for progression of ROM and guidance into exercercises. Use of outcome tool(s) and clinical judgement create a POC that gives a: Questionable prediction of patient's progress: MODERATE COMPLEXITY            TREATMENT:     (In addition to Assessment/Re-Assessment sessions the following treatments were rendered)  Pre-treatment Symptoms/Complaints:  Pt reports the wrist not hurting and she is ready to go back to work.     Pain: Initial:      0/10 Post Session: 0 /10     Therapeutic Exercise: ( ):  none   Date:  6/19/18 Date:  6/20/18 Date:  6/22/18 6/25/18    Activity/Exercise Parameters Parameters Parameters     digiflex Green 2x10 ea finger Green x 10  Red x10 Green x 10  Red x15 Green x12, x10    Wrist flex/ext 2# x10, 1# x10 2# 2x12 2# 2x15 2# 2x15    Rubber band finger extn 2x10  2x15 2x15    Supination/pronation x20 2x12 1# 2x15 Red flex bar 2x15    Ball squeezes x60sec   x20 reps x20    Bicep curls   2# B 2x10 3# B 2x10    Scapular retraction   Yellow 2x10 Red 2x10    protraction   Yellow 2x10 Red 2x10 Modalities: (9 minutes) 50% pulsed ultrasound at 1.0 w/cm2 for mechanical effects on tissue prior to stretching         MANUAL THERAPY: (15 minutes): for ROM. Grade 4- physiological mobilizations right wrist flexion; scar desensitization      HEP: continue current HEP for at least two more weeks. Pt agreed      Treatment/Session Assessment:    · Response to Treatment:  AROM WNL. Less scar sensitivity  · Compliance with Program/Exercises:  yes, per pt   · Recommendation: discharge with hEP  · \".   Total Treatment Duration:   24  minutes  PT Patient Time In/Time Out  Time In: 1240  Time Out: Edward 25, PT

## 2021-06-25 ENCOUNTER — HOSPITAL ENCOUNTER (EMERGENCY)
Age: 61
Discharge: HOME | End: 2021-06-25
Attending: EMERGENCY MEDICINE
Payer: COMMERCIAL

## 2021-06-25 ENCOUNTER — APPOINTMENT (OUTPATIENT)
Dept: GENERAL RADIOLOGY | Age: 61
End: 2021-06-25
Attending: EMERGENCY MEDICINE
Payer: COMMERCIAL

## 2021-06-25 VITALS
TEMPERATURE: 98.8 F | OXYGEN SATURATION: 96 % | RESPIRATION RATE: 18 BRPM | HEART RATE: 64 BPM | HEIGHT: 61 IN | SYSTOLIC BLOOD PRESSURE: 156 MMHG | WEIGHT: 170 LBS | DIASTOLIC BLOOD PRESSURE: 78 MMHG | BODY MASS INDEX: 32.1 KG/M2

## 2021-06-25 DIAGNOSIS — E11.65 TYPE 2 DIABETES MELLITUS WITH HYPERGLYCEMIA, WITHOUT LONG-TERM CURRENT USE OF INSULIN (HCC): ICD-10-CM

## 2021-06-25 DIAGNOSIS — I10 HYPERTENSION, UNSPECIFIED TYPE: ICD-10-CM

## 2021-06-25 DIAGNOSIS — R07.9 CHEST PAIN, UNSPECIFIED TYPE: Primary | ICD-10-CM

## 2021-06-25 LAB
ALBUMIN SERPL-MCNC: 3.8 G/DL (ref 3.2–4.6)
ALBUMIN/GLOB SERPL: 0.9 {RATIO} (ref 1.2–3.5)
ALP SERPL-CCNC: 101 U/L (ref 50–136)
ALT SERPL-CCNC: 23 U/L (ref 12–65)
ANION GAP SERPL CALC-SCNC: 6 MMOL/L (ref 7–16)
AST SERPL-CCNC: 13 U/L (ref 15–37)
BASOPHILS # BLD: 0 K/UL (ref 0–0.2)
BASOPHILS NFR BLD: 0 % (ref 0–2)
BILIRUB SERPL-MCNC: 0.2 MG/DL (ref 0.2–1.1)
BUN SERPL-MCNC: 16 MG/DL (ref 8–23)
CALCIUM SERPL-MCNC: 9.2 MG/DL (ref 8.3–10.4)
CHLORIDE SERPL-SCNC: 107 MMOL/L (ref 98–107)
CO2 SERPL-SCNC: 26 MMOL/L (ref 21–32)
CREAT SERPL-MCNC: 0.95 MG/DL (ref 0.6–1)
DIFFERENTIAL METHOD BLD: NORMAL
EOSINOPHIL # BLD: 0.1 K/UL (ref 0–0.8)
EOSINOPHIL NFR BLD: 1 % (ref 0.5–7.8)
ERYTHROCYTE [DISTWIDTH] IN BLOOD BY AUTOMATED COUNT: 14.1 % (ref 11.9–14.6)
GLOBULIN SER CALC-MCNC: 4.2 G/DL (ref 2.3–3.5)
GLUCOSE SERPL-MCNC: 291 MG/DL (ref 65–100)
HCT VFR BLD AUTO: 39 % (ref 35.8–46.3)
HGB BLD-MCNC: 12.6 G/DL (ref 11.7–15.4)
IMM GRANULOCYTES # BLD AUTO: 0 K/UL (ref 0–0.5)
IMM GRANULOCYTES NFR BLD AUTO: 0 % (ref 0–5)
LIPASE SERPL-CCNC: 119 U/L (ref 73–393)
LYMPHOCYTES # BLD: 1.8 K/UL (ref 0.5–4.6)
LYMPHOCYTES NFR BLD: 23 % (ref 13–44)
MAGNESIUM SERPL-MCNC: 2 MG/DL (ref 1.8–2.4)
MCH RBC QN AUTO: 26.6 PG (ref 26.1–32.9)
MCHC RBC AUTO-ENTMCNC: 32.3 G/DL (ref 31.4–35)
MCV RBC AUTO: 82.5 FL (ref 79.6–97.8)
MONOCYTES # BLD: 0.3 K/UL (ref 0.1–1.3)
MONOCYTES NFR BLD: 4 % (ref 4–12)
NEUTS SEG # BLD: 5.4 K/UL (ref 1.7–8.2)
NEUTS SEG NFR BLD: 71 % (ref 43–78)
NRBC # BLD: 0 K/UL (ref 0–0.2)
PLATELET # BLD AUTO: 217 K/UL (ref 150–450)
PMV BLD AUTO: 11.5 FL (ref 9.4–12.3)
POTASSIUM SERPL-SCNC: 4 MMOL/L (ref 3.5–5.1)
PROT SERPL-MCNC: 8 G/DL (ref 6.3–8.2)
RBC # BLD AUTO: 4.73 M/UL (ref 4.05–5.2)
SODIUM SERPL-SCNC: 139 MMOL/L (ref 136–145)
TROPONIN-HIGH SENSITIVITY: 6.2 PG/ML (ref 0–14)
WBC # BLD AUTO: 7.6 K/UL (ref 4.3–11.1)

## 2021-06-25 PROCEDURE — 94760 N-INVAS EAR/PLS OXIMETRY 1: CPT

## 2021-06-25 PROCEDURE — 83735 ASSAY OF MAGNESIUM: CPT

## 2021-06-25 PROCEDURE — 80053 COMPREHEN METABOLIC PANEL: CPT

## 2021-06-25 PROCEDURE — 71046 X-RAY EXAM CHEST 2 VIEWS: CPT

## 2021-06-25 PROCEDURE — 93005 ELECTROCARDIOGRAM TRACING: CPT | Performed by: EMERGENCY MEDICINE

## 2021-06-25 PROCEDURE — 85025 COMPLETE CBC W/AUTO DIFF WBC: CPT

## 2021-06-25 PROCEDURE — 99283 EMERGENCY DEPT VISIT LOW MDM: CPT

## 2021-06-25 PROCEDURE — 83690 ASSAY OF LIPASE: CPT

## 2021-06-25 PROCEDURE — 84484 ASSAY OF TROPONIN QUANT: CPT

## 2021-06-25 RX ORDER — SODIUM CHLORIDE 0.9 % (FLUSH) 0.9 %
5-10 SYRINGE (ML) INJECTION AS NEEDED
Status: DISCONTINUED | OUTPATIENT
Start: 2021-06-25 | End: 2021-06-25 | Stop reason: HOSPADM

## 2021-06-25 RX ORDER — SODIUM CHLORIDE 0.9 % (FLUSH) 0.9 %
5-10 SYRINGE (ML) INJECTION EVERY 8 HOURS
Status: DISCONTINUED | OUTPATIENT
Start: 2021-06-25 | End: 2021-06-25 | Stop reason: HOSPADM

## 2021-06-25 NOTE — DISCHARGE INSTRUCTIONS
Follow-up with [facility] cardiology, the office should call you on Monday to set up an appointment. Call them Monday afternoon if you have not yet heard back from them. You should take aspirin 81 mg daily until you see the cardiologist.  Return the emergency department if your symptoms worsen before then.

## 2021-06-25 NOTE — ED NOTES
I have reviewed discharge instructions with the patient. The patient verbalized understanding. Patient left ED via Discharge Method: ambulatory to Home with self     Opportunity for questions and clarification provided. Patient given 0 scripts. To continue your aftercare when you leave the hospital, you may receive an automated call from our care team to check in on how you are doing. This is a free service and part of our promise to provide the best care and service to meet your aftercare needs.  If you have questions, or wish to unsubscribe from this service please call [phone]. Thank you for Choosing our [address] [place] [address] Emergency Department.

## 2021-06-25 NOTE — ED PROVIDER NOTES
Patient has a history of type 2 diabetes, hypertension and is a former smoker who presents with chest pain. She states she had a nephew age 39 die 2 months ago of a heart attack. Since that time she has had intermittent chest pain. She states that last anywhere from a few seconds to 10 minutes. It comes on at random, is not associated with exertion. The pain gets of moderate intensity at times. She denies any associated shortness of breath or nausea or diaphoresis. She denies any radiation of the pain. She denies similar pain prior to the last 2 months. She states her mother  of a heart attack in her 62s. Patient is a former smoker, states she quit 20 to 30 years ago.            Past Medical History:   Diagnosis Date    Diabetes ([facility] [address]     Type 2 av BS 96 can not tell hypo       Past Surgical History:   Procedure Laterality Date    HX  SECTION      HX HYSTERECTOMY      HX ORTHOPAEDIC Right     knee surgery, menniscus tear         Family History:   Problem Relation Age of Onset    Diabetes Mother     Heart Disease Mother     Kidney Disease Father     Cancer Brother     Heart Disease Brother     Heart Disease Brother        Social History     Socioeconomic History    Marital status:      Spouse name: Not on file    Number of children: Not on file    Years of education: Not on file    Highest education level: Not on file   Occupational History    Not on file   Tobacco Use    Smoking status: Former Smoker     Years: 15.00     Quit date: 1998     Years since quittin.1    Smokeless tobacco: Never Used   Substance and Sexual Activity    Alcohol use: No    Drug use: Not on file    Sexual activity: Not on file   Other Topics Concern    Not on file   Social History Narrative    Not on file     Social Determinants of Health     Financial Resource Strain:     Difficulty of Paying Living Expenses:    Food Insecurity:     Worried About Running Out of Food in the Last Year:     Ran Out of Food in the Last Year:    Transportation Needs:     Lack of Transportation (Medical):  Lack of Transportation (Non-Medical):    Physical Activity:     Days of Exercise per Week:     Minutes of Exercise per Session:    Stress:     Feeling of Stress :    Social Connections:     Frequency of Communication with Friends and Family:     Frequency of Social Gatherings with Friends and Family:     Attends Latter-day Services:     Active Member of Clubs or Organizations:     Attends Club or Organization Meetings:     Marital Status:    Intimate Partner Violence:     Fear of Current or Ex-Partner:     Emotionally Abused:     Physically Abused:     Sexually Abused: ALLERGIES: Patient has no known allergies. Review of Systems   Constitutional: Negative for chills and fever. Gastrointestinal: Negative for nausea and vomiting. All other systems reviewed and are negative. Vitals:    06/25/21 1646   BP: (!) 162/79   Pulse: 79   Resp: 16   Temp: 98.8 °F (37.1 °C)   SpO2: 98%   Weight: 77.1 kg (170 lb)   Height: 5' 1\" (1.549 m)            Physical Exam  Vitals and nursing note reviewed. Constitutional:       Appearance: She is well-developed and normal weight. HENT:      Head: Normocephalic and atraumatic. Eyes:      Conjunctiva/sclera: Conjunctivae normal.      Pupils: Pupils are equal, round, and reactive to light. Cardiovascular:      Rate and Rhythm: Normal rate and regular rhythm. Pulmonary:      Effort: Pulmonary effort is normal. No respiratory distress. Musculoskeletal:         General: No tenderness. Cervical back: Normal range of motion and neck supple. Right lower leg: No edema. Left lower leg: No edema. Skin:     General: Skin is warm and dry. Neurological:      Mental Status: She is alert and oriented to person, place, and time.    Psychiatric:         Mood and Affect: Mood normal.         Behavior: Behavior normal. MDM  Number of Diagnoses or Management Options  Chest pain, unspecified type: new and requires workup  Hypertension, unspecified type  Type 2 diabetes mellitus with hyperglycemia, without long-term current use of insulin ([facility] [address]  Diagnosis management comments: 6:09 PM HEART score 2. Discussed results with patient, unremarkable work-up. She will be referred to cardiology for close outpatient follow-up. I have sent an email to their .        Amount and/or Complexity of Data Reviewed  Clinical lab tests: reviewed and ordered  Tests in the radiology section of CPT®: ordered and reviewed  Tests in the medicine section of CPT®: ordered and reviewed    Risk of Complications, Morbidity, and/or Mortality  Presenting problems: moderate  Diagnostic procedures: moderate  Management options: moderate    Patient Progress  Patient progress: stable         Procedures

## 2021-06-25 NOTE — ED TRIAGE NOTES
Patient arrives ambulatory to triage with mask in place. Patient reports intermittent right sided chest pain for the past month. Reports intermittent associated shortness of breath. Patient reports recently lost nephew to heart attack. Patient denies having personal cardiac hx.

## 2021-06-26 LAB
ATRIAL RATE: 72 BPM
CALCULATED P AXIS, ECG09: 62 DEGREES
CALCULATED R AXIS, ECG10: 43 DEGREES
CALCULATED T AXIS, ECG11: 13 DEGREES
DIAGNOSIS, 93000: NORMAL
P-R INTERVAL, ECG05: 150 MS
Q-T INTERVAL, ECG07: 378 MS
QRS DURATION, ECG06: 78 MS
QTC CALCULATION (BEZET), ECG08: 413 MS
VENTRICULAR RATE, ECG03: 72 BPM

## 2021-07-06 PROBLEM — I10 HYPERTENSION: Status: ACTIVE | Noted: 2021-07-06

## 2021-07-06 PROBLEM — R07.89 ATYPICAL CHEST PAIN: Status: ACTIVE | Noted: 2021-07-06

## 2021-07-06 PROBLEM — E78.5 HYPERLIPIDEMIA: Status: ACTIVE | Noted: 2021-07-06

## 2021-07-06 PROBLEM — R01.1 CARDIAC MURMUR: Status: ACTIVE | Noted: 2021-07-06

## 2022-03-18 PROBLEM — R01.1 CARDIAC MURMUR: Status: ACTIVE | Noted: 2021-07-06

## 2022-03-19 PROBLEM — I10 HYPERTENSION: Status: ACTIVE | Noted: 2021-07-06

## 2022-03-19 PROBLEM — E78.5 HYPERLIPIDEMIA: Status: ACTIVE | Noted: 2021-07-06

## 2022-03-19 PROBLEM — R07.89 ATYPICAL CHEST PAIN: Status: ACTIVE | Noted: 2021-07-06

## 2022-04-26 PROBLEM — E78.5 HYPERLIPIDEMIA ASSOCIATED WITH TYPE 2 DIABETES MELLITUS (HCC): Status: ACTIVE | Noted: 2020-04-30

## 2022-04-26 PROBLEM — E11.649 HYPOGLYCEMIA ASSOCIATED WITH DIABETES (HCC): Status: ACTIVE | Noted: 2020-04-30

## 2022-04-26 PROBLEM — E11.319 DIABETIC RETINOPATHY ASSOCIATED WITH TYPE 2 DIABETES MELLITUS (HCC): Status: ACTIVE | Noted: 2020-04-30

## 2022-04-26 PROBLEM — N84.2 VAGINAL POLYP: Status: ACTIVE | Noted: 2022-04-13

## 2022-04-26 PROBLEM — E11.59 HYPERTENSION ASSOCIATED WITH DIABETES (HCC): Status: ACTIVE | Noted: 2020-04-30

## 2022-04-26 PROBLEM — E66.09 OBESITY DUE TO EXCESS CALORIES: Status: ACTIVE | Noted: 2020-04-30

## 2022-04-26 PROBLEM — I15.2 HYPERTENSION ASSOCIATED WITH DIABETES (HCC): Status: ACTIVE | Noted: 2020-04-30

## 2022-04-26 PROBLEM — E11.69 HYPERLIPIDEMIA ASSOCIATED WITH TYPE 2 DIABETES MELLITUS (HCC): Status: ACTIVE | Noted: 2020-04-30

## 2022-06-13 DIAGNOSIS — Z12.11 SCREEN FOR COLON CANCER: Primary | ICD-10-CM

## 2022-06-28 ENCOUNTER — TELEPHONE (OUTPATIENT)
Dept: FAMILY MEDICINE CLINIC | Facility: CLINIC | Age: 62
End: 2022-06-28

## 2022-06-28 NOTE — LETTER
[address] [facility] [address]  Phone: [phone]  Fax: [phone]      June 30, 2022     [address]    Dear Megan Mckeon:    We have been trying to contact you regarding your diabetic test strips. Will you please call the office at [phone]. Marivel Conroy CMA ([address] [place] [address])    [facility] [address]  [address] [facility]: [phone]  Q:[phone]

## 2022-06-28 NOTE — TELEPHONE ENCOUNTER
Patient was called and stated  needles. Patient was asked what the gauge and length of  needles were. Stated they were thin and short. States it is more long that it is thick     Patient was not at home. [facility] [address] [facility] and they stated she uses them for the Ozempic and the needles come in the box with the medication. Patient also takes Lantus. DISCHARGE

## 2022-06-28 NOTE — TELEPHONE ENCOUNTER
----- Message from Paul Naqvi LPN sent at 9/50/8432  1:06 PM EDT -----  Subject: Refill Request    QUESTIONS  Name of Medication? Other - insulin needles  Patient-reported dosage and instructions? 3 daily  How many days do you have left? 0  [id number]  Pharmacy phone number (if available)? [phone]  ---------------------------------------------------------------------------  --------------  Oscar SR  What is the best way for the office to contact you? OK to leave message on   voicemail  Preferred Call Back Phone Number? [phone]  ---------------------------------------------------------------------------  --------------  SCRIPT ANSWERS  Relationship to Patient?  Self

## 2022-06-28 NOTE — LETTER
[address] [facility] [address]  Phone: [phone]  Fax: [address]  [address] [facility] [address] 1949, [address] [facility]           June 30, 2022           Dear Aurelia Lindquist,            We have been trying to get a hold of you regarding your diabetic test strips. Will you please call the office         at [phone]. Thank you            Jose Blakely CMA ([facility])            [facility]          [address]  [address] [facility],[address], [address]           P: [phone]         F:[phone]

## 2022-06-28 NOTE — TELEPHONE ENCOUNTER
Patient needs to call us back with the information from the box of needles with size and number she requires.

## 2022-06-29 ENCOUNTER — TELEPHONE (OUTPATIENT)
Dept: FAMILY MEDICINE CLINIC | Facility: CLINIC | Age: 62
End: 2022-06-29

## 2022-06-29 DIAGNOSIS — E11.9 TYPE 2 DIABETES MELLITUS WITHOUT COMPLICATION, UNSPECIFIED WHETHER LONG TERM INSULIN USE (HCC): Primary | ICD-10-CM

## 2022-06-29 NOTE — TELEPHONE ENCOUNTER
Patient informed via voicemail to call the office with the gauge and length of needle, as well as the brand.

## 2022-08-03 ENCOUNTER — NURSE ONLY (OUTPATIENT)
Dept: FAMILY MEDICINE CLINIC | Facility: CLINIC | Age: 62
End: 2022-08-03

## 2022-08-03 ENCOUNTER — TELEPHONE (OUTPATIENT)
Dept: FAMILY MEDICINE CLINIC | Facility: CLINIC | Age: 62
End: 2022-08-03

## 2022-08-03 DIAGNOSIS — E11.69 HYPERLIPIDEMIA ASSOCIATED WITH TYPE 2 DIABETES MELLITUS (HCC): ICD-10-CM

## 2022-08-03 DIAGNOSIS — E78.5 HYPERLIPIDEMIA ASSOCIATED WITH TYPE 2 DIABETES MELLITUS (HCC): ICD-10-CM

## 2022-08-03 DIAGNOSIS — E11.9 TYPE 2 DIABETES MELLITUS WITHOUT COMPLICATION, UNSPECIFIED WHETHER LONG TERM INSULIN USE (HCC): Primary | ICD-10-CM

## 2022-08-03 DIAGNOSIS — E11.9 TYPE 2 DIABETES MELLITUS WITHOUT COMPLICATION, UNSPECIFIED WHETHER LONG TERM INSULIN USE (HCC): ICD-10-CM

## 2022-08-03 LAB
CHOLEST SERPL-MCNC: 154 MG/DL
HDLC SERPL-MCNC: 46 MG/DL (ref 40–60)
HDLC SERPL: 3.3 {RATIO}
LDLC SERPL CALC-MCNC: 75.6 MG/DL
TRIGL SERPL-MCNC: 162 MG/DL (ref 35–150)
VLDLC SERPL CALC-MCNC: 32.4 MG/DL (ref 6–23)

## 2022-08-04 LAB
EST. AVERAGE GLUCOSE BLD GHB EST-MCNC: 255 MG/DL
HBA1C MFR BLD: 10.5 % (ref 4.8–5.6)

## 2022-08-12 DIAGNOSIS — E11.9 TYPE 2 DIABETES MELLITUS WITHOUT COMPLICATIONS (HCC): ICD-10-CM

## 2022-08-12 RX ORDER — SEMAGLUTIDE 1.34 MG/ML
INJECTION, SOLUTION SUBCUTANEOUS
Qty: 1.5 PEN | Refills: 1 | Status: SHIPPED | OUTPATIENT
Start: 2022-08-12 | End: 2022-08-22 | Stop reason: SDUPTHER

## 2022-08-22 ENCOUNTER — OFFICE VISIT (OUTPATIENT)
Dept: FAMILY MEDICINE CLINIC | Facility: CLINIC | Age: 62
End: 2022-08-22
Payer: COMMERCIAL

## 2022-08-22 ENCOUNTER — HOSPITAL ENCOUNTER (OUTPATIENT)
Dept: GENERAL RADIOLOGY | Age: 62
Discharge: HOME | End: 2022-08-25
Payer: COMMERCIAL

## 2022-08-22 VITALS
BODY MASS INDEX: 32.62 KG/M2 | WEIGHT: 172.8 LBS | TEMPERATURE: 97.6 F | HEIGHT: 61 IN | SYSTOLIC BLOOD PRESSURE: 124 MMHG | DIASTOLIC BLOOD PRESSURE: 76 MMHG | HEART RATE: 70 BPM | OXYGEN SATURATION: 98 %

## 2022-08-22 DIAGNOSIS — E11.9 TYPE 2 DIABETES MELLITUS WITHOUT COMPLICATION, WITH LONG-TERM CURRENT USE OF INSULIN (HCC): ICD-10-CM

## 2022-08-22 DIAGNOSIS — Z79.4 TYPE 2 DIABETES MELLITUS WITHOUT COMPLICATION, WITH LONG-TERM CURRENT USE OF INSULIN (HCC): ICD-10-CM

## 2022-08-22 DIAGNOSIS — G89.29 CHRONIC RIGHT SHOULDER PAIN: Primary | ICD-10-CM

## 2022-08-22 DIAGNOSIS — I10 ESSENTIAL (PRIMARY) HYPERTENSION: ICD-10-CM

## 2022-08-22 DIAGNOSIS — G89.29 CHRONIC RIGHT SHOULDER PAIN: ICD-10-CM

## 2022-08-22 DIAGNOSIS — M25.511 CHRONIC RIGHT SHOULDER PAIN: ICD-10-CM

## 2022-08-22 DIAGNOSIS — M25.511 CHRONIC RIGHT SHOULDER PAIN: Primary | ICD-10-CM

## 2022-08-22 PROCEDURE — 73030 X-RAY EXAM OF SHOULDER: CPT

## 2022-08-22 PROCEDURE — 99214 OFFICE O/P EST MOD 30 MIN: CPT | Performed by: FAMILY MEDICINE

## 2022-08-22 PROCEDURE — 3046F HEMOGLOBIN A1C LEVEL >9.0%: CPT | Performed by: FAMILY MEDICINE

## 2022-08-22 RX ORDER — LOSARTAN POTASSIUM 50 MG/1
50 TABLET ORAL DAILY
Qty: 30 TABLET | Refills: 5 | Status: SHIPPED | OUTPATIENT
Start: 2022-08-22

## 2022-08-22 RX ORDER — MELOXICAM 15 MG/1
15 TABLET ORAL DAILY
Qty: 90 TABLET | Refills: 1 | Status: SHIPPED | OUTPATIENT
Start: 2022-08-22

## 2022-08-22 RX ORDER — SEMAGLUTIDE 1.34 MG/ML
1 INJECTION, SOLUTION SUBCUTANEOUS WEEKLY
Qty: 5 PEN | Refills: 1 | Status: SHIPPED | OUTPATIENT
Start: 2022-08-22 | End: 2022-09-19

## 2022-08-22 ASSESSMENT — PATIENT HEALTH QUESTIONNAIRE - PHQ9
SUM OF ALL RESPONSES TO PHQ QUESTIONS 1-9: 2
SUM OF ALL RESPONSES TO PHQ9 QUESTIONS 1 & 2: 2
SUM OF ALL RESPONSES TO PHQ QUESTIONS 1-9: 2
1. LITTLE INTEREST OR PLEASURE IN DOING THINGS: 1
2. FEELING DOWN, DEPRESSED OR HOPELESS: 1

## 2022-08-22 ASSESSMENT — ENCOUNTER SYMPTOMS
SHORTNESS OF BREATH: 0
COUGH: 0

## 2022-08-22 NOTE — PROGRESS NOTES
Mili Sanchez (: 1960) is a 57 y.o. female, established patient, here for evaluation of the following chief complaint(s):  Diabetes, Hypertension, and Other (Right shoulder and knee pain)       ASSESSMENT/PLAN:  1. Chronic right shoulder pain  -     XR SHOULDER RIGHT (MIN 2 VIEWS); Future  -     meloxicam (MOBIC) 15 MG tablet; Take 1 tablet by mouth daily, Disp-90 tablet, R-1Normal  2. Type 2 diabetes mellitus without complications (HCC)  -     insulin glargine (LANTUS;BASAGLAR) 100 UNIT/ML injection pen; Inject 50 Units into the skin 2 times daily Lantus Brand, Insurance will not cover Basaglar, Disp-5 pen, R-5Normal  -     Semaglutide,0.25 or 0.5MG/DOS, (OZEMPIC, 0.25 OR 0.5 MG/DOSE,) 2 MG/1.5ML SOPN; Inject 1 mg into the skin once a week, Disp-5 pen, R-1Normal  3. Essential (primary) hypertension  -     losartan (COZAAR) 50 MG tablet; Take 1 tablet by mouth daily, Disp-30 tablet, R-5Normal    Will check x-ray right shoulder, given meloxicam for anti-inflammatory and pain relief. Will monitor symptoms closely consider Ortho and physical therapy if no improvement. Diabetes worsened from prior, will increase Ozempic to 1 mg/week continue Lantus at current dose. Will work on diet improvements as well. Blood pressure goal today we will continue current regiment. Return in about 3 months (around 2022) for follow up DM2 with non fasting labs 1 week prior. SUBJECTIVE/OBJECTIVE:  HPI  Patient presents for diabetes and blood pressure follow-up. She has been not been checking her blood sugars as regularly as she should but she is taking her Lantus and her Ozempic. She said her highest blood sugar tested was 250 lowest was 89. She said she has not been eating as healthy as she normally is. She said some shoulder neck pain on the right side, has some spasms. She says sometimes it is hard to hold her arm up. Denies any acute injury.       Review of Systems   Constitutional:  Negative for activity change, appetite change, fever and unexpected weight change. Respiratory:  Negative for cough and shortness of breath. Cardiovascular:  Negative for chest pain and palpitations. Skin:  Negative for rash. Neurological:  Negative for dizziness and headaches. Psychiatric/Behavioral:  Negative for sleep disturbance. Vitals:    08/22/22 1307   BP: 124/76   Pulse: 70   Temp: 97.6 °F (36.4 °C)   SpO2: 98%       Physical Exam  Vitals reviewed. Constitutional:       General: She is not in acute distress. Appearance: Normal appearance. She is not ill-appearing or toxic-appearing. HENT:      Head: Normocephalic and atraumatic. Eyes:      Conjunctiva/sclera: Conjunctivae normal.   Cardiovascular:      Rate and Rhythm: Normal rate and regular rhythm. Heart sounds: Normal heart sounds. No murmur heard. No friction rub. No gallop. Pulmonary:      Effort: Pulmonary effort is normal. No respiratory distress. Breath sounds: Normal breath sounds. No wheezing, rhonchi or rales. Musculoskeletal:         General: No swelling. Right shoulder: Tenderness present. No swelling or deformity. Decreased range of motion. Normal strength. Normal pulse. Skin:     General: Skin is warm. Findings: No rash. Neurological:      Mental Status: She is alert. Mental status is at baseline. Psychiatric:         Mood and Affect: Mood normal.          On this date, I spent 35 minutes reviewing previous notes, test results and face to face with the patient discussing the diagnosis and importance of compliance with the treatment plan as well as documenting on the day of the visit. An electronic signature was used to authenticate this note.   -- Tory Moore MD

## 2022-11-05 DIAGNOSIS — E78.5 HYPERLIPIDEMIA, UNSPECIFIED: ICD-10-CM

## 2022-11-07 RX ORDER — ATORVASTATIN CALCIUM 10 MG/1
TABLET, FILM COATED ORAL
Qty: 90 TABLET | Refills: 1 | OUTPATIENT
Start: 2022-11-07

## 2022-11-10 ENCOUNTER — OFFICE VISIT (OUTPATIENT)
Dept: FAMILY MEDICINE CLINIC | Facility: CLINIC | Age: 62
End: 2022-11-10
Payer: COMMERCIAL

## 2022-11-10 VITALS
DIASTOLIC BLOOD PRESSURE: 84 MMHG | SYSTOLIC BLOOD PRESSURE: 142 MMHG | HEIGHT: 61 IN | HEART RATE: 72 BPM | TEMPERATURE: 97.9 F | WEIGHT: 176 LBS | BODY MASS INDEX: 33.23 KG/M2 | OXYGEN SATURATION: 98 %

## 2022-11-10 DIAGNOSIS — E78.5 HYPERLIPIDEMIA, UNSPECIFIED HYPERLIPIDEMIA TYPE: ICD-10-CM

## 2022-11-10 DIAGNOSIS — I10 PRIMARY HYPERTENSION: ICD-10-CM

## 2022-11-10 DIAGNOSIS — G89.29 CHRONIC RIGHT SHOULDER PAIN: Primary | ICD-10-CM

## 2022-11-10 DIAGNOSIS — M25.511 CHRONIC RIGHT SHOULDER PAIN: Primary | ICD-10-CM

## 2022-11-10 DIAGNOSIS — E11.9 TYPE 2 DIABETES MELLITUS WITHOUT COMPLICATION, WITH LONG-TERM CURRENT USE OF INSULIN (HCC): ICD-10-CM

## 2022-11-10 DIAGNOSIS — Z79.4 TYPE 2 DIABETES MELLITUS WITHOUT COMPLICATION, WITH LONG-TERM CURRENT USE OF INSULIN (HCC): ICD-10-CM

## 2022-11-10 PROCEDURE — 99214 OFFICE O/P EST MOD 30 MIN: CPT | Performed by: NURSE PRACTITIONER

## 2022-11-10 PROCEDURE — 3078F DIAST BP <80 MM HG: CPT | Performed by: NURSE PRACTITIONER

## 2022-11-10 PROCEDURE — 3074F SYST BP LT 130 MM HG: CPT | Performed by: NURSE PRACTITIONER

## 2022-11-10 PROCEDURE — 3046F HEMOGLOBIN A1C LEVEL >9.0%: CPT | Performed by: NURSE PRACTITIONER

## 2022-11-10 RX ORDER — ATORVASTATIN CALCIUM 10 MG/1
TABLET, FILM COATED ORAL
Qty: 90 TABLET | Refills: 3 | Status: SHIPPED | OUTPATIENT
Start: 2022-11-10

## 2022-11-10 RX ORDER — ATORVASTATIN CALCIUM 10 MG/1
TABLET, FILM COATED ORAL
COMMUNITY
Start: 2022-08-11 | End: 2022-11-10 | Stop reason: SDUPTHER

## 2022-11-10 RX ORDER — LOSARTAN POTASSIUM AND HYDROCHLOROTHIAZIDE 12.5; 5 MG/1; MG/1
1 TABLET ORAL DAILY
Qty: 90 TABLET | Refills: 1 | Status: SHIPPED | OUTPATIENT
Start: 2022-11-10 | End: 2022-11-10

## 2022-11-10 RX ORDER — LOSARTAN POTASSIUM AND HYDROCHLOROTHIAZIDE 12.5; 5 MG/1; MG/1
1 TABLET ORAL DAILY
Qty: 90 TABLET | Refills: 1 | Status: SHIPPED | OUTPATIENT
Start: 2022-11-10

## 2022-11-10 ASSESSMENT — ENCOUNTER SYMPTOMS
COUGH: 0
EYE PAIN: 0
VOMITING: 0
SHORTNESS OF BREATH: 0
SINUS PAIN: 0
NAUSEA: 0
BACK PAIN: 0
WHEEZING: 0
DIARRHEA: 0
SORE THROAT: 0
ABDOMINAL PAIN: 0
CONSTIPATION: 0

## 2022-11-10 NOTE — PROGRESS NOTES
Nelly Lopez (:  1960) is a 57 y.o. female,Established patient, here for evaluation of the following chief complaint(s):  Hypertension (Fingers tingling, arm pain and coldness, no chest pain , no SOB. Wakes up in the middle of the night and her blood pressure is high. Was recently given inflammation //) and Medication Refill (Atorvastatin /)         ASSESSMENT/PLAN:  1. Chronic right shoulder pain  -     [facility] - Physical Therapy, [facility]  -     XR CERVICAL SPINE (2-3 VIEWS); Future  2. Type 2 diabetes mellitus without complication, with long-term current use of insulin (HCC)  -     Hemoglobin A1C; Future  3. Primary hypertension  -     losartan-hydroCHLOROthiazide (HYZAAR) 50-12.5 MG per tablet; Take 1 tablet by mouth daily, Disp-90 tablet, R-1Normal  4. Hyperlipidemia, unspecified hyperlipidemia type  -     atorvastatin (LIPITOR) 10 MG tablet; TAKE 1 TABLET BY MOUTH EVERY DAY, Disp-90 tablet, R-3Normal    Refer to PT for shoulder pain. Her symptoms make me suspect nerve involvement. Will xray her neck. Check A1c today. Remains hypertensive despite calibrating her BP cuff. Add HCTZ 12.5 mg Reviewed side effects, dosing, administration with patient. Refill atorvastatin. Return in about 6 weeks (around 2022) for BP monitoring. Subjective   SUBJECTIVE/OBJECTIVE:  Shoulder pain that has persisted since she was see in August by Dr. David Khan. Reports some numbness, coldness and tingling in the fingers of her right hand. Xray of shoulder in August was negative. Bought a blood pressure cuff because her eye doctor fussed at her. This was about two weeks ago. She has noticed that her pressures have been high at home. She brings the home monitor in today. Manual pressure 148/70. Blood pressure monitor initially read 166/78. I replaced the batteries and got 142/84.     Last A1c was elevated in August. She has been taking her medication as directed but reports she has not been eating right. Review of Systems   Constitutional:  Negative for appetite change, fatigue, fever and unexpected weight change. HENT:  Negative for congestion, ear pain, postnasal drip, sinus pain and sore throat. Eyes:  Negative for pain. Respiratory:  Negative for cough, shortness of breath and wheezing. Cardiovascular:  Negative for palpitations and leg swelling. Gastrointestinal:  Negative for abdominal pain, constipation, diarrhea, nausea and vomiting. Genitourinary:  Negative for dysuria, frequency and urgency. Musculoskeletal:  Negative for back pain and joint swelling. Skin:  Negative for rash and wound. Neurological:  Negative for dizziness, weakness and headaches. Hematological:  Does not bruise/bleed easily. Objective   Physical Exam  Vitals reviewed. Constitutional:       Appearance: Normal appearance. HENT:      Head: Normocephalic and atraumatic. Eyes:      Extraocular Movements: Extraocular movements intact. Pupils: Pupils are equal, round, and reactive to light. Cardiovascular:      Rate and Rhythm: Normal rate and regular rhythm. Heart sounds: Normal heart sounds. Pulmonary:      Effort: Pulmonary effort is normal.      Breath sounds: Normal breath sounds. Abdominal:      General: Abdomen is flat. Skin:     General: Skin is warm and dry. Neurological:      General: No focal deficit present. Mental Status: She is alert and oriented to person, place, and time. An electronic signature was used to authenticate this note.     --[facility], APRN - CNP

## 2022-11-13 LAB
EST. AVERAGE GLUCOSE BLD GHB EST-MCNC: 148 MG/DL
HBA1C MFR BLD: 6.8 % (ref 4.8–5.6)

## 2022-11-17 ENCOUNTER — HOSPITAL ENCOUNTER (OUTPATIENT)
Dept: PHYSICAL THERAPY | Age: 62
Setting detail: RECURRING SERIES
Discharge: HOME | End: 2022-11-20
Payer: COMMERCIAL

## 2022-11-17 PROCEDURE — 97110 THERAPEUTIC EXERCISES: CPT

## 2022-11-17 PROCEDURE — 97161 PT EVAL LOW COMPLEX 20 MIN: CPT

## 2022-11-17 NOTE — THERAPY EVALUATION
Domingo Snyder  : 1960  Primary: Geo Pritchard Sc  Secondary:  [address] [facility]  [address]  Phone: [phone]  Fax: [phone] Plan Frequency: 2x week  Plan of Care/Certification Expiration Date: 23    PT Visit Info: Total # of Visits to Date: 1  Progress Note Counter: 1    Visit Count:  1                OUTPATIENT PHYSICAL THERAPY:             OP NOTE TYPE: Initial Assessment 2022               Episode  Appt Desk         Treatment Diagnosis:    Pain in Right Shoulder (M25.511)  Stiffness of Right Shoulder, Not elsewhere classified (M25.611)  Generalized Muscle Weakness (M62.81)  Cervicalgia (M54.2)  Abnormal posture (R29.3)  Medical/Referring Diagnosis:  Chronic right shoulder pain [M25.511, G89.29]  Referring Physician:  LORENA Wade - CNP  MD Orders:  PT Eval and Treat   Return MD Appt:    Date of Onset:       Allergies:  Patient has no known allergies. Restrictions/Precautions:           Medications Last Reviewed:  2022     SUBJECTIVE   History of Injury/Illness (Reason for Referral):  Ms. Monique Amador reports a 3 year chronic history of neck and shoulder pain that has worsened this past summer. She reports that pain goes down the outside of her right arm and goes down into the palm side of her hand and her fingers, but not her thumb. She reports this pain with donning long sleeves and tucking in her shirt. She states the pain is now 2/10, but will get up to 10/10. 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 Patient Stated Goal(s): \"To have less pain in my arm\"  Initial:     2/10 Post Session:     2/10  Past Medical History/Comorbidities:   Ms. Monique Amador  has a past medical history of Diabetes ([facility] [address] Ms. Monique Amador  has a past surgical history that includes  section; orthopedic surgery (Right); and Hysterectomy.   Social History/Living Environment:   Type of Home: House     Prior Level of Function/Work/Activity:   Prior level of function: Independent           Learning:   Does the patient/guardian have any barriers to learning?: No barriers     Fall Risk Scale:    Odonnell Total Score: 0  Odonnell Fall Risk: Low (0-24)           OBJECTIVE   Posture:    -Forward head, rounded shoulders         Range of Motion  NT= not tested  Joint: Passive Passive Active Active    Right (Degrees) Left (Degrees) Right (Degrees)  Left Active (Degrees)   Shoulder Flexion 150  160   Shoulder Abduction 100 (muscle guarding)  WNL   Functional IR NT NT Reaches L1   Reaches L4   Functional ER NT NT WNL  WNL    Elbow Flexion WNL WNL WNL WNL   Elbow Extension WNL WNL WNL WNL   Forearm Pronation WNL WNL WNL WNL   Forearm Pronation WNL WNL WNL WNL   Wrist Flexion WNL WNL WNL WNL   Wrist Extension WNL WNL WNL WNL   Ulnar Deviation WNL WNL WNL WNL   Radial Deviation WNL WNL WNL WNL     Cervical AROM:   All WNL except L active sidebending that reproduces shoulder pain, but no arm or hand pain     ** indicates pain  Strength  Joint:      RIGHT LEFT   Shoulder Flexion 4**/5 5/5   Shoulder Extension 5/5 5/5   Shoulder Abduction 4**/5 4/5   Shoulder Internal Rotation 4-/5 5/5   Shoulder External Rotation 4-/5 5/5   Elbow Flexion 5/5 5/5   Elbow Extension 5/5 5/5   Forearm Pronation 5/5 5/5   Forearm Supination 5/5 5/5   Wrist Flexion 4/5 4/5   Wrist Extension 5/5 5/5     Neuro-Vascular  C/O Radicular Symptoms: some cervical AROM reproduces shoulder pain (see above)  UE NEUROLOGICAL SCREEN:    -Dermatomoes  Date:     Right Left   C2  WNL WNL   C3  WNL WNL   C4 WNL WNL   C5 WNL WNL   C6  WNL WNL   C7 WNL WNL   C8 Diminished  WNL   T1 Diminshed  WNL     Palpation: TTP across cervical vertebrae and R deltoid aspect of shoulder   Joint Mobilization:  Special Tests:   Dakota Villar (+) on R side  Amy Thorne (-) B   ASSESSMENT   Initial Assessment:    Ms. Zenobia Alvarado presents to therapy with R sided neck, shoulder, elbow and wrist pain that is radicular and postural in nature. 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 Currently recommending skilled therapy to address these aforementioned impairments and restore PLOF. Problem List: (Impacting functional limitations): Body Structures, Functions, Activity Limitations Requiring Skilled Therapeutic Intervention: Decreased functional mobility ; Decreased ADL status; Decreased ROM; Decreased strength; Decreased tolerance to work activity; Decreased body mechanics; Decreased endurance; Decreased sensation; Decreased fine motor control; Decreased coordination; Increased pain; Decreased posture     Therapy Prognosis:   Therapy Prognosis: Excellent     Initial Assessment Complexity:   Decision Making: Low Complexity    PLAN   Effective Dates:  TO Plan of Care/Certification Expiration Date: 02/20/23   Frequency/Duration: Plan Frequency: 2x week   Interventions Planned (Treatment may consist of any combination of the following):    Current Treatment Recommendations: Strengthening; ROM; Functional mobility training; Endurance training; Neuromuscular re-education; Manual Therapy - Soft Tissue Mobilization; Manual Therapy - Joint Manipulation; Pain management; Return to work related activity; Home exercise program; Safety education & training; Modalities; Integrated dry needling; Therapeutic activities     Goals: (Goals have been discussed and agreed upon with patient.)  GOALS: (Goals have been discussed and agreed upon with patient.)  Short Term Goals: Time Frame: 4 weeks  PT will be compliant with HEP.    Pt will decrease worst pain to 6/10 or less in order to improve pain with dressing  Pt will reduce DASH to 17/55 or less demonstrating overall improvements in functional mobility  Pt will improve gross R UE strength to 5/5 or greater   Pt will demonstrate active R shoulder Flexion and Abduction of 160 or greater to assist with dressing    GOALS: (Goals have been discussed and agreed upon with patient.)  Discharge Goals: Time Frame: 12 weeks  PT will be independent with HEP. Pt will decrease worst pain to 2/10 or less in order to return to improve pain with dressing  Pt will reduce DASH to 13/55 or less demonstrating overall improvements in functional mobility  Pt will report no difficulty with donning and doffing long sleeved clothing  Pt will report no difficulty performing work duties             Outcome Measure: Tool Used: Disabilities of the Arm, Shoulder and Hand (DASH) Questionnaire - Quick Version  Score:  Initial: 21/55  Most Recent: X/55 (Date: -- )   Interpretation of Score: The DASH is designed to measure the activities of daily living in person's with upper extremity dysfunction or pain. Each section is scored on a 1-5 scale, 5 representing the greatest disability. The scores of each section are added together for a total score of 55. Medical Necessity:   > Patient demonstrates excellent rehab potential due to higher previous functional level. Reason For Services/Other Comments:  > Patient continues to require skilled intervention due to severity of symptoms. Total Duration:  Time In: 1430  Time Out: 1510    Regarding Tura Lesches therapy, I certify that the treatment plan above will be carried out by a therapist or under their direction.   Thank you for this referral,  Natalie Jarquin, PT     Referring Physician Signature: Crista Wade*                    Post Session Pain  Charge Capture  PT Visit Info MD Dayanara Hutchins

## 2022-11-17 NOTE — PROGRESS NOTES
Lisette Stein  : 1960  Primary: Flo Hwang  Secondary:  [address] [facility]  [address]  Phone: [phone]  Fax: [phone] Plan Frequency: 2x week  Plan of Care/Certification Expiration Date: 23    PT Visit Info: Total # of Visits to Date: 1  Progress Note Counter: 1   Visit Count:  1   OUTPATIENT PHYSICAL THERAPY:OP NOTE TYPE: Treatment Note 2022       Episode  }Appt Desk             Treatment Diagnosis:    Pain in Right Shoulder (M25.511)  Stiffness of Right Shoulder, Not elsewhere classified (M25.611)  Generalized Muscle Weakness (M62.81)  Cervicalgia (M54.2)  Abnormal posture (R29.3)  Medical/Referring Diagnosis:  Chronic right shoulder pain [M25.511, G89.29]  Referring Physician:  LORENA Florence CNP, MD Orders:  PT Eval and Treat   Date of Onset:  No data recorded   Allergies:   Patient has no known allergies. Restrictions/Precautions:  No data recorded  No data recorded   Interventions Planned (Treatment may consist of any combination of the following):    Current Treatment Recommendations: Strengthening; ROM; Functional mobility training; Endurance training; Neuromuscular re-education; Manual Therapy - Soft Tissue Mobilization; Manual Therapy - Joint Manipulation; Pain management; Return to work related activity; Home exercise program; Safety education & training; Modalities; Integrated dry needling; Therapeutic activities     Subjective Comments:  see eval note  Initial:}    2/10Post Session:       2/10  Medications Last Reviewed:  2022  Updated Objective Findings:  See evaluation note from today  Treatment   THERAPEUTIC EXERCISE: (10 minutes):    Exercises per grid below to improve mobility, strength, and coordination.        Date:   Date:   Date:     Activity/Exercise Parameters Parameters Parameters   Education HEP, anatomy, posture, POC     Shoulder AAROM Flexion 3 x 10 MANUAL THERAPY: (0 minutes):   Joint mobilization, Soft tissue mobilization, and Manipulation was utilized and necessary because of the patient's restricted joint motion, painful spasm, loss of articular motion, and restricted motion of soft tissue. Treatment/Session Summary:    Treatment Assessment:     Communication/Consultation:  None today  Equipment provided today:  None  Recommendations/Intent for next treatment session: Next visit will focus on progressive strengthening.     Total Treatment Billable Duration:  10 minutes + untimed evaluation  Time In: 1430  Time Out: Isabell Johnson PT       Charge Capture  }Post Session Pain  PT Visit Info  MedBridge Portal  MD Guidelines  Scanned Media  Benefits  MyChart    Future Appointments   Date Time Provider Gauri Pierson   11/29/2022  2:30 PM Mihaela Grossman PT [facility]   12/1/2022  1:45 PM Mihaela Grossman PT [facility]   12/6/2022  3:15 PM Mihaela Grossman PT [facility]   12/8/2022  3:15 PM Mihaela Grossman PT [facility]   12/13/2022  3:15 PM Mihaela Grossman PT [facility]   12/15/2022  3:15 PM Mihaela Grossman PT [facility]   12/27/2022 10:00 AM LORENA Restrepo CNP [facility]

## 2022-11-22 ASSESSMENT — PAIN SCALES - GENERAL: PAINLEVEL_OUTOF10: 2

## 2022-11-29 ENCOUNTER — APPOINTMENT (OUTPATIENT)
Dept: PHYSICAL THERAPY | Age: 62
End: 2022-11-29
Payer: COMMERCIAL

## 2022-12-01 ENCOUNTER — HOSPITAL ENCOUNTER (OUTPATIENT)
Dept: PHYSICAL THERAPY | Age: 62
Setting detail: RECURRING SERIES
Discharge: HOME | End: 2022-12-04
Payer: COMMERCIAL

## 2022-12-01 PROCEDURE — 97110 THERAPEUTIC EXERCISES: CPT

## 2022-12-01 PROCEDURE — 97140 MANUAL THERAPY 1/> REGIONS: CPT

## 2022-12-01 ASSESSMENT — PAIN SCALES - GENERAL: PAINLEVEL_OUTOF10: 2

## 2022-12-01 NOTE — PROGRESS NOTES
Sylvia Cuevas  : 1960  Primary: Dilip Fatoumata Hwang  Secondary:  Alessandro Lopez @ [address] [place] [address]  [address]  Phone: [phone]  Fax: [phone] Plan Frequency: 2x week  Plan of Care/Certification Expiration Date: 23      PT Visit Info: Total # of Visits to Date: 1  Progress Note Counter: 2     Visit Count:  2   OUTPATIENT PHYSICAL THERAPY:OP NOTE TYPE: Treatment Note 2022       Episode  }Appt Desk             Treatment Diagnosis:    Pain in Right Shoulder (M25.511)  Stiffness of Right Shoulder, Not elsewhere classified (M25.611)  Generalized Muscle Weakness (M62.81)  Cervicalgia (M54.2)  Abnormal posture (R29.3)  Medical/Referring Diagnosis:  Chronic right shoulder pain [M25.511, G89.29]  Referring Physician:  LORENA Ng CNP, MD Orders:  PT Eval and Treat   Date of Onset:  No data recorded   Allergies:   Patient has no known allergies. Restrictions/Precautions:  No data recorded  No data recorded   Interventions Planned (Treatment may consist of any combination of the following):    Current Treatment Recommendations: Strengthening; ROM; Functional mobility training; Endurance training; Neuromuscular re-education; Manual Therapy - Soft Tissue Mobilization; Manual Therapy - Joint Manipulation; Pain management; Return to work related activity; Home exercise program; Safety education & training; Modalities; Integrated dry needling; Therapeutic activities     Subjective Comments:  Ms. Elise Raya reports that she got caught in some traffic on the way here  Initial:}    2/10Post Session:       2/10  Medications Last Reviewed:  2022  Updated Objective Findings:  See evaluation note from today  Treatment   THERAPEUTIC EXERCISE: (26 minutes):    Exercises per grid below to improve mobility, strength, and coordination.        Date:   Date:   Date:     Activity/Exercise Parameters Parameters Parameters   Education HEP, anatomy, posture, POC HEP update    Warm Up   4 min Pulley    Shoulder AAROM Flexion 3 x 10  W/ 2# bar   3 x 10 supine,  W/ HHA 3 x 10     Scap Retract  3 x 10 Seated    Rows  3 x 10 w/ YTT                        MANUAL THERAPY: (10 minutes):   Joint mobilization, Soft tissue mobilization, and Manipulation was utilized and necessary because of the patient's restricted joint motion, painful spasm, loss of articular motion, and restricted motion of soft tissue.   - R glenohumeral global PROM  -R glenohumeral distraction  -R glenohumeral mobs grades 2-3  Treatment/Session Summary:    Treatment Assessment:  good introduction to glenohumeral mobilization and scapular strengthening  Communication/Consultation:  None today  Equipment provided today:  None  Recommendations/Intent for next treatment session: Next visit will focus on progressive strengthening.     Total Treatment Billable Duration:  36 minutes   Time In: 5778  Time Out: [facility], PT       Charge Capture  }Post Session Pain  PT Visit Info  MedBridge Portal  MD Guidelines  Scanned Media  Benefits  MyChart    Future Appointments   Date Time Provider Gauri Pierson   12/6/2022  3:15 PM Fabiene Leelee, PT [facility]   12/8/2022  3:15 PM Fabiene Leelee, PT [facility]   12/13/2022  3:15 PM Fabiene Gause, PT [facility]   12/15/2022  3:15 PM Fabiene Gause, PT [facility]   12/27/2022 10:00 AM LORENA Quinn - [facility]

## 2022-12-08 ENCOUNTER — HOSPITAL ENCOUNTER (OUTPATIENT)
Dept: PHYSICAL THERAPY | Age: 62
Setting detail: RECURRING SERIES
Discharge: HOME | End: 2022-12-11
Payer: COMMERCIAL

## 2022-12-08 PROCEDURE — 97110 THERAPEUTIC EXERCISES: CPT

## 2022-12-08 PROCEDURE — 97140 MANUAL THERAPY 1/> REGIONS: CPT

## 2022-12-08 ASSESSMENT — PAIN SCALES - GENERAL: PAINLEVEL_OUTOF10: 6

## 2022-12-08 NOTE — PROGRESS NOTES
Petar Reynolds  : 1960  Primary: Alise Cesar Sc  Secondary:  [address] @ [address] [place] [address]  [address]  Phone: [phone]  Fax: [phone] Plan Frequency: 2x week  Plan of Care/Certification Expiration Date: 23      PT Visit Info: Total # of Visits to Date: 1  Progress Note Counter: 2  No Show: 1     Visit Count:  3   OUTPATIENT PHYSICAL THERAPY:OP NOTE TYPE: Treatment Note 2022       Episode  }Appt Desk             Treatment Diagnosis:    Pain in Right Shoulder (M25.511)  Stiffness of Right Shoulder, Not elsewhere classified (M25.611)  Generalized Muscle Weakness (M62.81)  Cervicalgia (M54.2)  Abnormal posture (R29.3)  Medical/Referring Diagnosis:  Chronic right shoulder pain [M25.511, G89.29]  Referring Physician:  LORENA Zaldivar CNP, MD Orders:  PT Eval and Treat   Date of Onset:  No data recorded   Allergies:   Patient has no known allergies. Restrictions/Precautions:  No data recorded  No data recorded   Interventions Planned (Treatment may consist of any combination of the following):    Current Treatment Recommendations: Strengthening; ROM; Functional mobility training; Endurance training; Neuromuscular re-education; Manual Therapy - Soft Tissue Mobilization; Manual Therapy - Joint Manipulation; Pain management; Return to work related activity; Home exercise program; Safety education & training; Modalities; Integrated dry needling; Therapeutic activities     Subjective Comments:  reports she is feeling a little stiff today  Initial:}    6/10Post Session:       6/10  Medications Last Reviewed:  2022  Updated Objective Findings:  See evaluation note from today  Treatment   THERAPEUTIC EXERCISE: (28 minutes):    Exercises per grid below to improve mobility, strength, and coordination.        Date:   Date:   Date:     Activity/Exercise Parameters Parameters Parameters   Education HEP, anatomy, posture, POC HEP update    Warm Up   4 min Pulley 5 min Pulley   Shoulder AAROM Flexion 3 x 10  W/ 2# bar   3 x 10 supine,  W/ HHA 3 x 10  W/ 2# bar  2 x 10 flex, ABD, ER   Scap Retract  3 x 10 Seated 3 x 10 Seated   Rows  3 x 10 w/ YTT 3 x 10 w/TYB   Stir The [place]ron    2 x 10 B clockwise and counterclockwise                 MANUAL THERAPY: (10 minutes):   Joint mobilization, Soft tissue mobilization, and Manipulation was utilized and necessary because of the patient's restricted joint motion, painful spasm, loss of articular motion, and restricted motion of soft tissue.   - R glenohumeral global PROM  -R glenohumeral distraction  -R glenohumeral mobs grades 2-3  Treatment/Session Summary:    Treatment Assessment:  good progression in shoulder mobility today. Discussed HEP w/ patient. Will continue to strengthen per POC  Communication/Consultation:  None today  Equipment provided today:  None  Recommendations/Intent for next treatment session: Next visit will focus on progressive strengthening.     Total Treatment Billable Duration:  38 minutes   Time In: 8881  Time Out: 3693 Chelita Mark PT       Charge Capture  }Post Session Pain  PT Visit Info  MedBridge Portal  MD Guidelines  Scanned Media  Benefits  MyChart    Future Appointments   Date Time Provider Gauri Pierson   12/13/2022  3:15 PM Nate Christel, PT [facility]   12/15/2022  3:15 PM Nate Christel, PT [facility]   12/27/2022 10:00 AM LORENA Wolf - [facility]

## 2022-12-13 ENCOUNTER — HOSPITAL ENCOUNTER (OUTPATIENT)
Dept: PHYSICAL THERAPY | Age: 62
Setting detail: RECURRING SERIES
Discharge: HOME | End: 2022-12-16
Payer: COMMERCIAL

## 2022-12-13 PROCEDURE — 97110 THERAPEUTIC EXERCISES: CPT

## 2022-12-13 PROCEDURE — 97140 MANUAL THERAPY 1/> REGIONS: CPT

## 2022-12-13 ASSESSMENT — PAIN SCALES - GENERAL: PAINLEVEL_OUTOF10: 3

## 2022-12-13 NOTE — PROGRESS NOTES
Chun Goff  : 1960  Primary: Davide Espinoza Sc  Secondary:  [address] @ [address] [facility]  [address]  Phone: [phone]  Fax: [phone] Plan Frequency: 2x week  Plan of Care/Certification Expiration Date: 23      PT Visit Info: Total # of Visits Approved: 4  Total # of Visits to Date: 1  Progress Note Counter: 4  No Show: 1     Visit Count:  4   OUTPATIENT PHYSICAL THERAPY:OP NOTE TYPE: Treatment Note 2022       Episode  }Appt Desk             Treatment Diagnosis:    Pain in Right Shoulder (M25.511)  Stiffness of Right Shoulder, Not elsewhere classified (M25.611)  Generalized Muscle Weakness (M62.81)  Cervicalgia (M54.2)  Abnormal posture (R29.3)  Medical/Referring Diagnosis:  Chronic right shoulder pain [M25.511, G89.29]  Referring Physician:  LORENA Savage CNP, MD Orders:  PT Eval and Treat   Date of Onset:  No data recorded   Allergies:   Patient has no known allergies. Restrictions/Precautions:  No data recorded  No data recorded   Interventions Planned (Treatment may consist of any combination of the following):    Current Treatment Recommendations: Strengthening; ROM; Functional mobility training; Endurance training; Neuromuscular re-education; Manual Therapy - Soft Tissue Mobilization; Manual Therapy - Joint Manipulation; Pain management; Return to work related activity; Home exercise program; Safety education & training; Modalities; Integrated dry needling; Therapeutic activities     Subjective Comments:  reports she is doing better today  Initial:}    3/10Post Session:        /10  Medications Last Reviewed:  2022  Updated Objective Findings:  See evaluation note from today  Treatment   THERAPEUTIC EXERCISE: (29 minutes):    Exercises per grid below to improve mobility, strength, and coordination.        Date:   Date:   Date:   Date:      Activity/Exercise Parameters Parameters Parameters Education HEP, anatomy, posture, POC HEP update  Shoulder mobilization   Warm Up   4 min Pulley 5 min Pulley 3 min pulley    Shoulder AAROM Flexion 3 x 10  W/ 2# bar   3 x 10 supine,  W/ HHA 3 x 10  W/ 2# bar  2 x 10 flex, ABD, ER W/ towel slide on plinth   3 x10 flex,  x 10 ABD, 2 x 10   Scap Retract  3 x 10 Seated 3 x 10 Seated    Rows  3 x 10 w/ YTT 3 x 10 w/TYB 3 x 10 w/ RTB   Stir The Cauldron    2 x 10 B clockwise and counterclockwise 2 x 10 B clockwise and counterclockwise                   MANUAL THERAPY: (10 minutes):   Joint mobilization, Soft tissue mobilization, and Manipulation was utilized and necessary because of the patient's restricted joint motion, painful spasm, loss of articular motion, and restricted motion of soft tissue.   - R glenohumeral global PROM  -R glenohumeral distraction  -R glenohumeral mobs grades 2-3  Treatment/Session Summary:    Treatment Assessment:  good progression in shoulder theratube resistance and sholder flex ROM  Communication/Consultation:  None today  Equipment provided today:  None  Recommendations/Intent for next treatment session: Next visit will focus on progressive strengthening.     Total Treatment Billable Duration:  39 minutes   Time In: 0237  Time Out: [address] PT       Charge Capture  }Post Session Pain  PT Visit Info  MedBridge Portal  MD Guidelines  Scanned Media  Benefits  MyChart    Future Appointments   Date Time Provider Gauri Pierson   12/15/2022  3:15 PM Moises Ramsay PT [facility]   12/27/2022 10:00 AM LORENA Garcia - [facility]

## 2022-12-15 ENCOUNTER — HOSPITAL ENCOUNTER (OUTPATIENT)
Dept: PHYSICAL THERAPY | Age: 62
Setting detail: RECURRING SERIES
End: 2022-12-15
Payer: COMMERCIAL

## 2022-12-20 ENCOUNTER — HOSPITAL ENCOUNTER (OUTPATIENT)
Dept: PHYSICAL THERAPY | Age: 62
Setting detail: RECURRING SERIES
End: 2022-12-20
Payer: COMMERCIAL

## 2022-12-20 NOTE — PROGRESS NOTES
Elena Allison  : 1960  Primary: Bette Weinberg Sc  Secondary:  Leanne Price  [address] [place] [address]  Phone: [phone]  Fax: [phone]    PT Visit Info: Total # of Visits Approved: 4  Total # of Visits to Date: 1  Progress Note Counter: 4  No Show: 1     OT Visit Info:  No data recorded    OUTPATIENT THERAPY:OP NOTE TYPE: Progress Report 2022               Episode  Appt Desk           Elena Allison cancelled her appointment for today due to work related issues. It was explained to patient that if she cancels again, she will be d/c'd. Will plan to follow up next during next appointment.   Thank you,  Betha Kawasaki, PT    Future Appointments   Date Time Provider Gauri Pierson   2022  3:15 PM Betha Kawasaki, [facility]   2022 10:00 AM LORENA Burk - CNP [facility]   1/3/2023  3:15 PM Betha Kawasaki, [facility]

## 2022-12-27 ENCOUNTER — OFFICE VISIT (OUTPATIENT)
Dept: FAMILY MEDICINE CLINIC | Facility: CLINIC | Age: 62
End: 2022-12-27
Payer: COMMERCIAL

## 2022-12-27 VITALS
WEIGHT: 175.8 LBS | HEIGHT: 61 IN | BODY MASS INDEX: 33.19 KG/M2 | SYSTOLIC BLOOD PRESSURE: 114 MMHG | OXYGEN SATURATION: 97 % | HEART RATE: 77 BPM | DIASTOLIC BLOOD PRESSURE: 70 MMHG | RESPIRATION RATE: 18 BRPM | TEMPERATURE: 98 F

## 2022-12-27 DIAGNOSIS — Z79.4 TYPE 2 DIABETES MELLITUS WITHOUT COMPLICATION, WITH LONG-TERM CURRENT USE OF INSULIN (HCC): ICD-10-CM

## 2022-12-27 DIAGNOSIS — Z23 NEED FOR PROPHYLACTIC VACCINATION AND INOCULATION AGAINST VARICELLA: ICD-10-CM

## 2022-12-27 DIAGNOSIS — Z11.59 NEED FOR HEPATITIS C SCREENING TEST: ICD-10-CM

## 2022-12-27 DIAGNOSIS — E11.59 HYPERTENSION ASSOCIATED WITH DIABETES (HCC): Primary | ICD-10-CM

## 2022-12-27 DIAGNOSIS — Z12.11 SCREENING FOR COLON CANCER: ICD-10-CM

## 2022-12-27 DIAGNOSIS — Z23 NEED FOR PROPHYLACTIC VACCINATION AGAINST DIPHTHERIA-TETANUS-PERTUSSIS (DTP): ICD-10-CM

## 2022-12-27 DIAGNOSIS — I15.2 HYPERTENSION ASSOCIATED WITH DIABETES (HCC): Primary | ICD-10-CM

## 2022-12-27 DIAGNOSIS — M25.511 CHRONIC RIGHT SHOULDER PAIN: ICD-10-CM

## 2022-12-27 DIAGNOSIS — E11.9 TYPE 2 DIABETES MELLITUS WITHOUT COMPLICATION, WITH LONG-TERM CURRENT USE OF INSULIN (HCC): ICD-10-CM

## 2022-12-27 DIAGNOSIS — Z11.4 SCREENING FOR HIV WITHOUT PRESENCE OF RISK FACTORS: ICD-10-CM

## 2022-12-27 DIAGNOSIS — G89.29 CHRONIC RIGHT SHOULDER PAIN: ICD-10-CM

## 2022-12-27 DIAGNOSIS — Z12.31 ENCOUNTER FOR SCREENING MAMMOGRAM FOR BREAST CANCER: ICD-10-CM

## 2022-12-27 LAB
ANION GAP SERPL CALC-SCNC: 4 MMOL/L (ref 2–11)
BUN SERPL-MCNC: 14 MG/DL (ref 8–23)
CALCIUM SERPL-MCNC: 9.5 MG/DL (ref 8.3–10.4)
CHLORIDE SERPL-SCNC: 109 MMOL/L (ref 101–110)
CO2 SERPL-SCNC: 31 MMOL/L (ref 21–32)
CREAT SERPL-MCNC: 1.2 MG/DL (ref 0.6–1)
CREAT UR-MCNC: 86 MG/DL
GLUCOSE SERPL-MCNC: 258 MG/DL (ref 65–100)
MICROALBUMIN UR-MCNC: 1.38 MG/DL (ref 0–3)
MICROALBUMIN/CREAT UR-RTO: 16 MG/G (ref 0–30)
POTASSIUM SERPL-SCNC: 4 MMOL/L (ref 3.5–5.1)
SODIUM SERPL-SCNC: 144 MMOL/L (ref 133–143)

## 2022-12-27 PROCEDURE — 90471 IMMUNIZATION ADMIN: CPT | Performed by: NURSE PRACTITIONER

## 2022-12-27 PROCEDURE — 3078F DIAST BP <80 MM HG: CPT | Performed by: NURSE PRACTITIONER

## 2022-12-27 PROCEDURE — 3074F SYST BP LT 130 MM HG: CPT | Performed by: NURSE PRACTITIONER

## 2022-12-27 PROCEDURE — 90715 TDAP VACCINE 7 YRS/> IM: CPT | Performed by: NURSE PRACTITIONER

## 2022-12-27 PROCEDURE — 3044F HG A1C LEVEL LT 7.0%: CPT | Performed by: NURSE PRACTITIONER

## 2022-12-27 PROCEDURE — 99214 OFFICE O/P EST MOD 30 MIN: CPT | Performed by: NURSE PRACTITIONER

## 2022-12-27 RX ORDER — MELOXICAM 15 MG/1
15 TABLET ORAL DAILY
Qty: 90 TABLET | Refills: 1 | Status: SHIPPED | OUTPATIENT
Start: 2022-12-27

## 2022-12-27 ASSESSMENT — ENCOUNTER SYMPTOMS
ABDOMINAL PAIN: 0
WHEEZING: 0
SHORTNESS OF BREATH: 0
SINUS PAIN: 0
BACK PAIN: 0
VOMITING: 0
DIARRHEA: 0
SORE THROAT: 0
COUGH: 0
EYE PAIN: 0
CONSTIPATION: 0
NAUSEA: 0

## 2022-12-27 NOTE — PROGRESS NOTES
Petar Reynolds (:  1960) is a 57 y.o. female,Established patient, here for evaluation of the following chief complaint(s):  Follow-up (6 week follow up)         ASSESSMENT/PLAN:  1. Hypertension associated with diabetes ([address]  2. Chronic right shoulder pain  -     meloxicam (MOBIC) 15 MG tablet; Take 1 tablet by mouth daily, Disp-90 tablet, R-1Normal  3. Type 2 diabetes mellitus without complication, with long-term current use of insulin (HCC)  -     Microalbumin / creatinine urine ratio; Future  -     Basic Metabolic Panel; Future  -     AFL - Rohith Wilkes DPM, [facility], [place]  4. Screening for HIV without presence of risk factors  -     HIV 1/2 Ag/Ab, 4TH Generation,W Rflx Confirm; Future  5. Need for hepatitis C screening test  -     Hepatitis C Antibody; Future  6. Need for prophylactic vaccination against diphtheria-tetanus-pertussis (DTP)  -     Tdap (age 6y and older) IM (Boostrix)  7. Screening for colon cancer  -     AFL - [facility]- Colonoscopy  8. Need for prophylactic vaccination and inoculation against varicella  -     zoster recombinant adjuvanted vaccine [facility]) 50 MCG/0.5ML SUSR injection; Inject 0.5 mLs into the muscle once for 1 dose 50 MCG IM then repeat 2-6 months., Disp-1 each, R-1Print  9. Encounter for screening mammogram for breast cancer  -     [facility] Digital Screen Bilateral [[id number]]; Future    Normotensive in clinic today. Continue meds at current doses. Encouraged to get her xray done. Provided with addresses of outpatient radiology locations. Refer to podiatry to evaluate nails and perform diabetic foot exam.   Patient declines COVID, pneumococcal and influenza vaccinations. Routine Health and Prevention:   Medications were reconciled at today's visit and their health maintenance items were reviewed & updated as well. Recommended exercise and balanced diet. As scheduled in 2023.          Subjective SUBJECTIVE/OBJECTIVE:  HPI    Review of Systems   Constitutional:  Negative for appetite change, fatigue, fever and unexpected weight change. HENT:  Negative for congestion, ear pain, postnasal drip, sinus pain and sore throat. Eyes:  Negative for pain. Respiratory:  Negative for cough, shortness of breath and wheezing. Cardiovascular:  Negative for palpitations and leg swelling. Gastrointestinal:  Negative for abdominal pain, constipation, diarrhea, nausea and vomiting. Genitourinary:  Negative for dysuria, frequency and urgency. Musculoskeletal:  Negative for back pain and joint swelling. Skin:  Negative for rash and wound. Neurological:  Negative for dizziness, weakness and headaches. Hematological:  Does not bruise/bleed easily. Objective   Physical Exam  Vitals reviewed. Constitutional:       Appearance: Normal appearance. HENT:      Head: Normocephalic and atraumatic. Eyes:      Extraocular Movements: Extraocular movements intact. Pupils: Pupils are equal, round, and reactive to light. Cardiovascular:      Rate and Rhythm: Normal rate and regular rhythm. Heart sounds: Normal heart sounds. Pulmonary:      Effort: Pulmonary effort is normal.      Breath sounds: Normal breath sounds. Abdominal:      General: Abdomen is flat. Skin:     General: Skin is warm and dry. Neurological:      General: No focal deficit present. Mental Status: She is alert and oriented to person, place, and time. An electronic signature was used to authenticate this note.     --[facility], APRN - CNP

## 2022-12-28 ENCOUNTER — TELEPHONE (OUTPATIENT)
Dept: FAMILY MEDICINE CLINIC | Facility: CLINIC | Age: 62
End: 2022-12-28

## 2022-12-28 LAB
HCV AB SER QL: NONREACTIVE
HIV 1+2 AB+HIV1 P24 AG SERPL QL IA: NONREACTIVE
HIV 1/2 RESULT COMMENT: NORMAL

## 2022-12-28 NOTE — TELEPHONE ENCOUNTER
----- Message from [facility], APRN - CNP sent at 12/28/2022  8:07 AM EST -----  Her kidney function is declining and her glucose was elevated. Will recheck these when I see her in the beginning of March.      [facility], APRN - CNP

## 2022-12-28 NOTE — TELEPHONE ENCOUNTER
----- Message from [facility], APRN - CNP sent at 12/28/2022  8:07 AM EST -----  Her kidney function is declining and her glucose was elevated. Will recheck these when I see her in the beginning of March.      [facility], APRN - CNP

## 2023-01-17 ENCOUNTER — HOSPITAL ENCOUNTER (OUTPATIENT)
Dept: MAMMOGRAPHY | Age: 63
Discharge: HOME | End: 2023-01-20
Payer: COMMERCIAL

## 2023-01-17 DIAGNOSIS — Z12.31 ENCOUNTER FOR SCREENING MAMMOGRAM FOR BREAST CANCER: ICD-10-CM

## 2023-01-17 PROCEDURE — 77067 SCR MAMMO BI INCL CAD: CPT

## 2023-01-19 ENCOUNTER — TELEPHONE (OUTPATIENT)
Dept: FAMILY MEDICINE CLINIC | Facility: CLINIC | Age: 63
End: 2023-01-19

## 2023-01-19 NOTE — TELEPHONE ENCOUNTER
----- Message from [facility] E[facility], APRN - CNP sent at 1/19/2023 11:09 AM EST -----  Please let patient know her mammogram was normal. We will repeat in 1 year.      [facility], APRN - CNP

## 2023-02-24 ENCOUNTER — PATIENT MESSAGE (OUTPATIENT)
Dept: FAMILY MEDICINE CLINIC | Facility: CLINIC | Age: 63
End: 2023-02-24

## 2023-03-02 ENCOUNTER — OFFICE VISIT (OUTPATIENT)
Dept: FAMILY MEDICINE CLINIC | Facility: CLINIC | Age: 63
End: 2023-03-02
Payer: COMMERCIAL

## 2023-03-02 VITALS
WEIGHT: 176.8 LBS | RESPIRATION RATE: 18 BRPM | SYSTOLIC BLOOD PRESSURE: 142 MMHG | HEART RATE: 77 BPM | HEIGHT: 61 IN | BODY MASS INDEX: 33.38 KG/M2 | OXYGEN SATURATION: 97 % | DIASTOLIC BLOOD PRESSURE: 70 MMHG | TEMPERATURE: 97.7 F

## 2023-03-02 DIAGNOSIS — G89.29 CHRONIC PAIN OF RIGHT KNEE: ICD-10-CM

## 2023-03-02 DIAGNOSIS — E11.9 TYPE 2 DIABETES MELLITUS WITHOUT COMPLICATION, WITH LONG-TERM CURRENT USE OF INSULIN (HCC): Primary | ICD-10-CM

## 2023-03-02 DIAGNOSIS — I10 PRIMARY HYPERTENSION: ICD-10-CM

## 2023-03-02 DIAGNOSIS — Z79.4 TYPE 2 DIABETES MELLITUS WITHOUT COMPLICATION, WITH LONG-TERM CURRENT USE OF INSULIN (HCC): Primary | ICD-10-CM

## 2023-03-02 DIAGNOSIS — E11.59 HYPERTENSION ASSOCIATED WITH DIABETES (HCC): ICD-10-CM

## 2023-03-02 DIAGNOSIS — I15.2 HYPERTENSION ASSOCIATED WITH DIABETES (HCC): ICD-10-CM

## 2023-03-02 DIAGNOSIS — M25.561 CHRONIC PAIN OF RIGHT KNEE: ICD-10-CM

## 2023-03-02 PROCEDURE — 3077F SYST BP >= 140 MM HG: CPT | Performed by: NURSE PRACTITIONER

## 2023-03-02 PROCEDURE — 99214 OFFICE O/P EST MOD 30 MIN: CPT | Performed by: NURSE PRACTITIONER

## 2023-03-02 PROCEDURE — 3078F DIAST BP <80 MM HG: CPT | Performed by: NURSE PRACTITIONER

## 2023-03-02 RX ORDER — SEMAGLUTIDE 1.34 MG/ML
INJECTION, SOLUTION SUBCUTANEOUS
COMMUNITY
Start: 2023-02-12

## 2023-03-02 SDOH — ECONOMIC STABILITY: INCOME INSECURITY: HOW HARD IS IT FOR YOU TO PAY FOR THE VERY BASICS LIKE FOOD, HOUSING, MEDICAL CARE, AND HEATING?: NOT HARD AT ALL

## 2023-03-02 SDOH — ECONOMIC STABILITY: HOUSING INSECURITY
IN THE LAST 12 MONTHS, WAS THERE A TIME WHEN YOU DID NOT HAVE A STEADY PLACE TO SLEEP OR SLEPT IN A SHELTER (INCLUDING NOW)?: NO

## 2023-03-02 SDOH — ECONOMIC STABILITY: FOOD INSECURITY: WITHIN THE PAST 12 MONTHS, YOU WORRIED THAT YOUR FOOD WOULD RUN OUT BEFORE YOU GOT MONEY TO BUY MORE.: NEVER TRUE

## 2023-03-02 SDOH — ECONOMIC STABILITY: FOOD INSECURITY: WITHIN THE PAST 12 MONTHS, THE FOOD YOU BOUGHT JUST DIDN'T LAST AND YOU DIDN'T HAVE MONEY TO GET MORE.: NEVER TRUE

## 2023-03-02 ASSESSMENT — PATIENT HEALTH QUESTIONNAIRE - PHQ9
SUM OF ALL RESPONSES TO PHQ9 QUESTIONS 1 & 2: 0
SUM OF ALL RESPONSES TO PHQ QUESTIONS 1-9: 0
1. LITTLE INTEREST OR PLEASURE IN DOING THINGS: 0
2. FEELING DOWN, DEPRESSED OR HOPELESS: 0
SUM OF ALL RESPONSES TO PHQ QUESTIONS 1-9: 0

## 2023-03-02 ASSESSMENT — ENCOUNTER SYMPTOMS
BACK PAIN: 0
SHORTNESS OF BREATH: 0
COUGH: 0
EYE PAIN: 0
DIARRHEA: 0
ABDOMINAL PAIN: 0
WHEEZING: 0
NAUSEA: 0
SINUS PAIN: 0
CONSTIPATION: 0
VOMITING: 0
SORE THROAT: 0

## 2023-03-02 NOTE — PROGRESS NOTES
Jamari Mcclure (:  1960) is a 57 y.o. female,Established patient, here for evaluation of the following chief complaint(s):  Follow-up (4 month f/u//Knee pain )         ASSESSMENT/PLAN:  1. Type 2 diabetes mellitus without complication, with long-term current use of insulin ([facility])  -     Comprehensive Metabolic Panel; Future  -     Hemoglobin A1C; Future  2. Primary hypertension  -     Comprehensive Metabolic Panel; Future  3. Hypertension associated with diabetes ([address]  4. Chronic pain of right knee  -     XR KNEE RIGHT (3 VIEWS); Future    Will recheck her labs today. She did have some decreased kidney function in December. Will adjust diabetic medications pending A1c today. Again, she is a poor historian and I had a hard time obtaining a full history of the hypoglycemic episodes from her. Will order an xray of her knee. Encouraged compression bracing in the meantime. Return in about 6 months (around 2023). Subjective   SUBJECTIVE/OBJECTIVE:  Complains of right knee pain. Had a meniscal repair about 10 years ago, but has started having a lot of swelling in her knee and increased pain. Had a glucose of 45 this morning. She was able to correct the hypoglycemia. She reports compliance with her medications. She also reports her glucose has been low lately, but she does not bring a log with her. She is not a very reliable historian. Review of Systems   Constitutional:  Negative for appetite change, fatigue, fever and unexpected weight change. HENT:  Negative for congestion, ear pain, postnasal drip, sinus pain and sore throat. Eyes:  Negative for pain. Respiratory:  Negative for cough, shortness of breath and wheezing. Cardiovascular:  Negative for palpitations and leg swelling. Gastrointestinal:  Negative for abdominal pain, constipation, diarrhea, nausea and vomiting. Genitourinary:  Negative for dysuria, frequency and urgency.    Musculoskeletal:  Positive for arthralgias and joint swelling. Negative for back pain. Skin:  Negative for rash and wound. Neurological:  Negative for dizziness, weakness and headaches. Hematological:  Does not bruise/bleed easily. Objective   Physical Exam  Vitals reviewed. Constitutional:       Appearance: Normal appearance. HENT:      Head: Normocephalic and atraumatic. Eyes:      Extraocular Movements: Extraocular movements intact. Pupils: Pupils are equal, round, and reactive to light. Cardiovascular:      Rate and Rhythm: Normal rate and regular rhythm. Heart sounds: Normal heart sounds. Pulmonary:      Effort: Pulmonary effort is normal.      Breath sounds: Normal breath sounds. Abdominal:      General: Abdomen is flat. Musculoskeletal:      Right knee: Swelling present. Decreased range of motion. Right lower le+ Pitting Edema present. Left lower le+ Pitting Edema present. Skin:     General: Skin is warm and dry. Neurological:      General: No focal deficit present. Mental Status: She is alert and oriented to person, place, and time. An electronic signature was used to authenticate this note.     --LORENA Rosenbaum - CNP

## 2023-03-03 ENCOUNTER — HOSPITAL ENCOUNTER (OUTPATIENT)
Dept: GENERAL RADIOLOGY | Age: 63
Discharge: HOME | End: 2023-03-03
Payer: COMMERCIAL

## 2023-03-03 DIAGNOSIS — G89.29 CHRONIC PAIN OF RIGHT KNEE: ICD-10-CM

## 2023-03-03 DIAGNOSIS — M25.561 CHRONIC PAIN OF RIGHT KNEE: ICD-10-CM

## 2023-03-03 PROCEDURE — 73562 X-RAY EXAM OF KNEE 3: CPT

## 2023-03-06 ENCOUNTER — TELEPHONE (OUTPATIENT)
Dept: FAMILY MEDICINE CLINIC | Facility: CLINIC | Age: 63
End: 2023-03-06

## 2023-03-06 NOTE — TELEPHONE ENCOUNTER
Contacted patient. Advised of results and provider comments. Verbalized understanding. No questions.      DARRELL MURCIA

## 2023-03-06 NOTE — TELEPHONE ENCOUNTER
----- Message from [facility], [address] - CNP sent at 3/6/2023 12:43 PM EST -----  Please let patient know their imaging study was normal.     [facility], APRN - CNP

## 2023-04-06 DIAGNOSIS — E11.9 TYPE 2 DIABETES MELLITUS WITHOUT COMPLICATION, WITH LONG-TERM CURRENT USE OF INSULIN (HCC): ICD-10-CM

## 2023-04-06 DIAGNOSIS — E78.5 DYSLIPIDEMIA: ICD-10-CM

## 2023-04-06 DIAGNOSIS — Z79.4 TYPE 2 DIABETES MELLITUS WITHOUT COMPLICATION, WITH LONG-TERM CURRENT USE OF INSULIN (HCC): ICD-10-CM

## 2023-04-06 DIAGNOSIS — I10 PRIMARY HYPERTENSION: Primary | ICD-10-CM

## 2023-04-06 PROBLEM — R07.89 ATYPICAL CHEST PAIN: Status: RESOLVED | Noted: 2021-07-06 | Resolved: 2023-04-06

## 2023-04-07 ENCOUNTER — NURSE ONLY (OUTPATIENT)
Dept: FAMILY MEDICINE CLINIC | Facility: CLINIC | Age: 63
End: 2023-04-07

## 2023-04-07 DIAGNOSIS — E78.5 DYSLIPIDEMIA: ICD-10-CM

## 2023-04-07 DIAGNOSIS — E11.9 TYPE 2 DIABETES MELLITUS WITHOUT COMPLICATION, WITH LONG-TERM CURRENT USE OF INSULIN (HCC): ICD-10-CM

## 2023-04-07 DIAGNOSIS — I10 PRIMARY HYPERTENSION: ICD-10-CM

## 2023-04-07 DIAGNOSIS — Z79.4 TYPE 2 DIABETES MELLITUS WITHOUT COMPLICATION, WITH LONG-TERM CURRENT USE OF INSULIN (HCC): ICD-10-CM

## 2023-04-07 LAB
EST. AVERAGE GLUCOSE BLD GHB EST-MCNC: 140 MG/DL
HBA1C MFR BLD: 6.5 % (ref 4.8–5.6)

## 2023-04-08 LAB
ALBUMIN SERPL-MCNC: 3.7 G/DL (ref 3.2–4.6)
ALBUMIN/GLOB SERPL: 1 (ref 0.4–1.6)
ALP SERPL-CCNC: 88 U/L (ref 50–136)
ALT SERPL-CCNC: 35 U/L (ref 12–65)
ANION GAP SERPL CALC-SCNC: 6 MMOL/L (ref 2–11)
AST SERPL-CCNC: 23 U/L (ref 15–37)
BILIRUB SERPL-MCNC: 0.2 MG/DL (ref 0.2–1.1)
BUN SERPL-MCNC: 13 MG/DL (ref 8–23)
CALCIUM SERPL-MCNC: 9.1 MG/DL (ref 8.3–10.4)
CHLORIDE SERPL-SCNC: 110 MMOL/L (ref 101–110)
CHOLEST SERPL-MCNC: 188 MG/DL
CO2 SERPL-SCNC: 28 MMOL/L (ref 21–32)
CREAT SERPL-MCNC: 1 MG/DL (ref 0.6–1)
GLOBULIN SER CALC-MCNC: 3.6 G/DL (ref 2.8–4.5)
GLUCOSE SERPL-MCNC: 84 MG/DL (ref 65–100)
HDLC SERPL-MCNC: 54 MG/DL (ref 40–60)
HDLC SERPL: 3.5
LDLC SERPL CALC-MCNC: 119.6 MG/DL
POTASSIUM SERPL-SCNC: 3.8 MMOL/L (ref 3.5–5.1)
PROT SERPL-MCNC: 7.3 G/DL (ref 6.3–8.2)
SODIUM SERPL-SCNC: 144 MMOL/L (ref 133–143)
TRIGL SERPL-MCNC: 72 MG/DL (ref 35–150)
VLDLC SERPL CALC-MCNC: 14.4 MG/DL (ref 6–23)

## 2023-06-17 DIAGNOSIS — I10 PRIMARY HYPERTENSION: ICD-10-CM

## 2023-06-19 RX ORDER — LOSARTAN POTASSIUM AND HYDROCHLOROTHIAZIDE 12.5; 5 MG/1; MG/1
TABLET ORAL
Qty: 90 TABLET | Refills: 1 | OUTPATIENT
Start: 2023-06-19

## 2023-07-06 DIAGNOSIS — E11.9 TYPE 2 DIABETES MELLITUS WITHOUT COMPLICATION, WITH LONG-TERM CURRENT USE OF INSULIN (HCC): ICD-10-CM

## 2023-07-06 DIAGNOSIS — Z79.4 TYPE 2 DIABETES MELLITUS WITHOUT COMPLICATION, WITH LONG-TERM CURRENT USE OF INSULIN (HCC): ICD-10-CM

## 2023-07-07 RX ORDER — INSULIN GLARGINE 100 [IU]/ML
INJECTION, SOLUTION SUBCUTANEOUS
OUTPATIENT
Start: 2023-07-07

## 2023-07-10 DIAGNOSIS — E11.9 TYPE 2 DIABETES MELLITUS WITHOUT COMPLICATION, WITH LONG-TERM CURRENT USE OF INSULIN (HCC): ICD-10-CM

## 2023-07-10 DIAGNOSIS — Z79.4 TYPE 2 DIABETES MELLITUS WITHOUT COMPLICATION, WITH LONG-TERM CURRENT USE OF INSULIN (HCC): ICD-10-CM

## 2023-07-12 DIAGNOSIS — I10 PRIMARY HYPERTENSION: ICD-10-CM

## 2023-07-12 DIAGNOSIS — E78.5 HYPERLIPIDEMIA, UNSPECIFIED HYPERLIPIDEMIA TYPE: ICD-10-CM

## 2023-07-12 DIAGNOSIS — G89.29 CHRONIC RIGHT SHOULDER PAIN: ICD-10-CM

## 2023-07-12 DIAGNOSIS — M25.511 CHRONIC RIGHT SHOULDER PAIN: ICD-10-CM

## 2023-07-12 DIAGNOSIS — E11.9 TYPE 2 DIABETES MELLITUS WITHOUT COMPLICATION, WITH LONG-TERM CURRENT USE OF INSULIN (HCC): ICD-10-CM

## 2023-07-12 DIAGNOSIS — Z79.4 TYPE 2 DIABETES MELLITUS WITHOUT COMPLICATION, WITH LONG-TERM CURRENT USE OF INSULIN (HCC): ICD-10-CM

## 2023-07-12 DIAGNOSIS — E11.9 TYPE 2 DIABETES MELLITUS WITHOUT COMPLICATION, UNSPECIFIED WHETHER LONG TERM INSULIN USE (HCC): ICD-10-CM

## 2023-07-12 RX ORDER — INSULIN GLARGINE 100 [IU]/ML
INJECTION, SOLUTION SUBCUTANEOUS
OUTPATIENT
Start: 2023-07-12

## 2023-07-12 RX ORDER — MELOXICAM 15 MG/1
15 TABLET ORAL DAILY
Qty: 90 TABLET | Refills: 1 | Status: SHIPPED | OUTPATIENT
Start: 2023-07-12

## 2023-07-12 RX ORDER — LOSARTAN POTASSIUM AND HYDROCHLOROTHIAZIDE 12.5; 5 MG/1; MG/1
1 TABLET ORAL DAILY
Qty: 90 TABLET | Refills: 1 | Status: SHIPPED | OUTPATIENT
Start: 2023-07-12

## 2023-07-12 RX ORDER — ATORVASTATIN CALCIUM 10 MG/1
TABLET, FILM COATED ORAL
Qty: 90 TABLET | Refills: 1 | Status: SHIPPED | OUTPATIENT
Start: 2023-07-12

## 2023-07-12 NOTE — TELEPHONE ENCOUNTER
Pt is requesting a refill for atorvastatin, insulin pen needle, losartan, meloxicam, and insulin glargine      Pharmacy: [address] [place] [address],[facility], [address] [address] [place] [address]

## 2023-07-13 DIAGNOSIS — E11.9 TYPE 2 DIABETES MELLITUS WITHOUT COMPLICATION, WITH LONG-TERM CURRENT USE OF INSULIN (HCC): ICD-10-CM

## 2023-07-13 DIAGNOSIS — E11.9 TYPE 2 DIABETES MELLITUS WITHOUT COMPLICATION, UNSPECIFIED WHETHER LONG TERM INSULIN USE (HCC): ICD-10-CM

## 2023-07-13 DIAGNOSIS — Z79.4 TYPE 2 DIABETES MELLITUS WITHOUT COMPLICATION, WITH LONG-TERM CURRENT USE OF INSULIN (HCC): ICD-10-CM

## 2023-07-20 DIAGNOSIS — E11.9 TYPE 2 DIABETES MELLITUS WITHOUT COMPLICATION, UNSPECIFIED WHETHER LONG TERM INSULIN USE (HCC): ICD-10-CM

## 2023-07-21 ENCOUNTER — TELEPHONE (OUTPATIENT)
Dept: FAMILY MEDICINE CLINIC | Facility: CLINIC | Age: 63
End: 2023-07-21

## 2023-07-21 NOTE — TELEPHONE ENCOUNTER
Patient called about her Lantus not being sent to pharmacy. Called the pharmacy and was tolf the lantus was on hold but generic not covered. Brand lantus covered but at $100. Left patient a message to let her know.

## 2023-09-05 ENCOUNTER — NURSE ONLY (OUTPATIENT)
Dept: FAMILY MEDICINE CLINIC | Facility: CLINIC | Age: 63
End: 2023-09-05

## 2023-09-05 DIAGNOSIS — E11.9 TYPE 2 DIABETES MELLITUS WITHOUT COMPLICATION, WITH LONG-TERM CURRENT USE OF INSULIN (HCC): Primary | ICD-10-CM

## 2023-09-05 DIAGNOSIS — Z79.4 TYPE 2 DIABETES MELLITUS WITHOUT COMPLICATION, WITH LONG-TERM CURRENT USE OF INSULIN (HCC): Primary | ICD-10-CM

## 2023-09-05 DIAGNOSIS — E11.9 TYPE 2 DIABETES MELLITUS WITHOUT COMPLICATION, WITH LONG-TERM CURRENT USE OF INSULIN (HCC): ICD-10-CM

## 2023-09-05 DIAGNOSIS — Z79.4 TYPE 2 DIABETES MELLITUS WITHOUT COMPLICATION, WITH LONG-TERM CURRENT USE OF INSULIN (HCC): ICD-10-CM

## 2023-09-05 DIAGNOSIS — E78.5 DYSLIPIDEMIA: ICD-10-CM

## 2023-09-05 LAB
ALBUMIN SERPL-MCNC: 3.7 G/DL (ref 3.2–4.6)
ALBUMIN/GLOB SERPL: 0.9 (ref 0.4–1.6)
ALP SERPL-CCNC: 106 U/L (ref 50–136)
ALT SERPL-CCNC: 31 U/L (ref 12–65)
ANION GAP SERPL CALC-SCNC: 4 MMOL/L (ref 2–11)
AST SERPL-CCNC: 18 U/L (ref 15–37)
BILIRUB SERPL-MCNC: 0.2 MG/DL (ref 0.2–1.1)
BUN SERPL-MCNC: 13 MG/DL (ref 8–23)
CALCIUM SERPL-MCNC: 9.7 MG/DL (ref 8.3–10.4)
CHLORIDE SERPL-SCNC: 112 MMOL/L (ref 101–110)
CHOLEST SERPL-MCNC: 188 MG/DL
CO2 SERPL-SCNC: 31 MMOL/L (ref 21–32)
CREAT SERPL-MCNC: 0.9 MG/DL (ref 0.6–1)
CREAT UR-MCNC: 80 MG/DL
GLOBULIN SER CALC-MCNC: 3.9 G/DL (ref 2.8–4.5)
GLUCOSE SERPL-MCNC: 196 MG/DL (ref 65–100)
HDLC SERPL-MCNC: 52 MG/DL (ref 40–60)
HDLC SERPL: 3.6
LDLC SERPL CALC-MCNC: 118.8 MG/DL
MICROALBUMIN UR-MCNC: 0.64 MG/DL
MICROALBUMIN/CREAT UR-RTO: 8 MG/G (ref 0–30)
POTASSIUM SERPL-SCNC: 4.1 MMOL/L (ref 3.5–5.1)
PROT SERPL-MCNC: 7.6 G/DL (ref 6.3–8.2)
SODIUM SERPL-SCNC: 147 MMOL/L (ref 133–143)
TRIGL SERPL-MCNC: 86 MG/DL (ref 35–150)
VLDLC SERPL CALC-MCNC: 17.2 MG/DL (ref 6–23)

## 2023-09-06 LAB
EST. AVERAGE GLUCOSE BLD GHB EST-MCNC: 186 MG/DL
HBA1C MFR BLD: 8.1 % (ref 4.8–5.6)

## 2023-09-21 RX ORDER — SEMAGLUTIDE 1.34 MG/ML
INJECTION, SOLUTION SUBCUTANEOUS
Refills: 2 | OUTPATIENT
Start: 2023-09-21

## 2023-10-04 PROBLEM — E11.319 TYPE 2 DIABETES MELLITUS WITH RETINOPATHY, WITH LONG-TERM CURRENT USE OF INSULIN (HCC): Status: ACTIVE | Noted: 2020-04-30

## 2023-10-04 PROBLEM — E11.319 DIABETIC RETINOPATHY ASSOCIATED WITH TYPE 2 DIABETES MELLITUS (HCC): Status: RESOLVED | Noted: 2020-04-30 | Resolved: 2023-10-04

## 2023-11-06 NOTE — PROGRESS NOTES
Jacinto Severance (: 1960) is a 70 y.o. female, an established patient, is here for evaluation of the following chief complaint(s):  Chief Complaint   Patient presents with    Hypertension    Diabetes    Cholesterol Problem    Results    Skin Problem    Shoulder Pain          ASSESSMENT/PLAN:  Jaqui Thompson was seen today for hypertension, diabetes, cholesterol problem, results, skin problem and shoulder pain. Diagnoses and all orders for this visit:    Type 2 diabetes mellitus with retinopathy, with long-term current use of insulin, macular edema presence unspecified, unspecified laterality, unspecified retinopathy severity (HCC)  -     valACYclovir (VALTREX) 1 g tablet; Take 1 tablet by mouth 3 times daily for 7 days  -     LANTUS SOLOSTAR 100 UNIT/ML injection pen; Inject 50 Units into the skin 2 times daily EREN  -     Basic Metabolic Panel; Future  -     Hemoglobin A1C; Future    Dyslipidemia    Primary hypertension  -     losartan-hydroCHLOROthiazide (HYZAAR) 50-12.5 MG per tablet; Take 1 tablet by mouth daily    Chronic right shoulder pain  -     meloxicam (MOBIC) 15 MG tablet; Take 1 tablet by mouth daily    Hyperlipidemia, unspecified hyperlipidemia type  -     atorvastatin (LIPITOR) 20 MG tablet; Take 1 tablet by mouth at bedtime TAKE 1 TABLET BY MOUTH EVERY DAY    Postmenopausal  -     DEXA BONE DENSITY AXIAL SKELETON; Future    Encounter for screening mammogram for malignant neoplasm of breast  -     DAQUAN DIGITAL SCREEN W OR WO CAD BILATERAL; Future    Other orders  -     OZEMPIC, 1 MG/DOSE, 4 MG/3ML SOPN; INJECT 1MG INTO THE SKIN ONCE A WEEK        This is my initial visit w/ Ms. Amy Emily. I reviewed recent lab results (from 23) w/  MsRodolfo Amy Emily. Diabetes is poorly controlled. Her HgA1C is up to 8.1%.   Currently prescribed:   Key Antihyperglycemic Medications            insulin glargine (LANTUS;BASAGLAR) 100 UNIT/ML injection pen    Sig - Route: Inject 50 Units into the skin 2 times

## 2023-11-07 ENCOUNTER — OFFICE VISIT (OUTPATIENT)
Dept: FAMILY MEDICINE CLINIC | Facility: CLINIC | Age: 63
End: 2023-11-07
Payer: COMMERCIAL

## 2023-11-07 VITALS
DIASTOLIC BLOOD PRESSURE: 76 MMHG | OXYGEN SATURATION: 99 % | RESPIRATION RATE: 16 BRPM | HEART RATE: 69 BPM | TEMPERATURE: 98.7 F | BODY MASS INDEX: 32.97 KG/M2 | WEIGHT: 174.6 LBS | SYSTOLIC BLOOD PRESSURE: 118 MMHG | HEIGHT: 61 IN

## 2023-11-07 DIAGNOSIS — Z12.31 ENCOUNTER FOR SCREENING MAMMOGRAM FOR MALIGNANT NEOPLASM OF BREAST: ICD-10-CM

## 2023-11-07 DIAGNOSIS — E78.5 HYPERLIPIDEMIA, UNSPECIFIED HYPERLIPIDEMIA TYPE: ICD-10-CM

## 2023-11-07 DIAGNOSIS — M25.511 CHRONIC RIGHT SHOULDER PAIN: ICD-10-CM

## 2023-11-07 DIAGNOSIS — E11.319 TYPE 2 DIABETES MELLITUS WITH RETINOPATHY, WITH LONG-TERM CURRENT USE OF INSULIN, MACULAR EDEMA PRESENCE UNSPECIFIED, UNSPECIFIED LATERALITY, UNSPECIFIED RETINOPATHY SEVERITY (HCC): Primary | ICD-10-CM

## 2023-11-07 DIAGNOSIS — I10 PRIMARY HYPERTENSION: ICD-10-CM

## 2023-11-07 DIAGNOSIS — Z79.4 TYPE 2 DIABETES MELLITUS WITH RETINOPATHY, WITH LONG-TERM CURRENT USE OF INSULIN, MACULAR EDEMA PRESENCE UNSPECIFIED, UNSPECIFIED LATERALITY, UNSPECIFIED RETINOPATHY SEVERITY (HCC): Primary | ICD-10-CM

## 2023-11-07 DIAGNOSIS — E78.5 DYSLIPIDEMIA: ICD-10-CM

## 2023-11-07 DIAGNOSIS — G89.29 CHRONIC RIGHT SHOULDER PAIN: ICD-10-CM

## 2023-11-07 DIAGNOSIS — Z78.0 POSTMENOPAUSAL: ICD-10-CM

## 2023-11-07 PROCEDURE — 3078F DIAST BP <80 MM HG: CPT | Performed by: PHYSICIAN ASSISTANT

## 2023-11-07 PROCEDURE — 3074F SYST BP LT 130 MM HG: CPT | Performed by: PHYSICIAN ASSISTANT

## 2023-11-07 PROCEDURE — 1123F ACP DISCUSS/DSCN MKR DOCD: CPT | Performed by: PHYSICIAN ASSISTANT

## 2023-11-07 PROCEDURE — 3052F HG A1C>EQUAL 8.0%<EQUAL 9.0%: CPT | Performed by: PHYSICIAN ASSISTANT

## 2023-11-07 PROCEDURE — 99214 OFFICE O/P EST MOD 30 MIN: CPT | Performed by: PHYSICIAN ASSISTANT

## 2023-11-07 RX ORDER — MELOXICAM 15 MG/1
15 TABLET ORAL DAILY
Qty: 90 TABLET | Refills: 1 | Status: SHIPPED | OUTPATIENT
Start: 2023-11-07

## 2023-11-07 RX ORDER — VALACYCLOVIR HYDROCHLORIDE 1 G/1
1000 TABLET, FILM COATED ORAL 3 TIMES DAILY
Qty: 21 TABLET | Refills: 0 | Status: SHIPPED | OUTPATIENT
Start: 2023-11-07 | End: 2023-11-14

## 2023-11-07 RX ORDER — INSULIN GLARGINE 100 [IU]/ML
50 INJECTION, SOLUTION SUBCUTANEOUS 2 TIMES DAILY
Qty: 90 ML | Refills: 1 | Status: SHIPPED | OUTPATIENT
Start: 2023-11-07

## 2023-11-07 RX ORDER — LOSARTAN POTASSIUM AND HYDROCHLOROTHIAZIDE 12.5; 5 MG/1; MG/1
1 TABLET ORAL DAILY
Qty: 90 TABLET | Refills: 1 | Status: SHIPPED | OUTPATIENT
Start: 2023-11-07

## 2023-11-07 RX ORDER — SEMAGLUTIDE 1.34 MG/ML
INJECTION, SOLUTION SUBCUTANEOUS
Qty: 9 ML | Refills: 1 | Status: SHIPPED | OUTPATIENT
Start: 2023-11-07

## 2023-11-07 RX ORDER — ATORVASTATIN CALCIUM 20 MG/1
20 TABLET, FILM COATED ORAL NIGHTLY
Qty: 90 TABLET | Refills: 1 | Status: SHIPPED | OUTPATIENT
Start: 2023-11-07

## 2024-01-09 ENCOUNTER — HOSPITAL ENCOUNTER (OUTPATIENT)
Dept: MAMMOGRAPHY | Age: 64
Discharge: HOME | End: 2024-01-12
Payer: COMMERCIAL

## 2024-01-09 VITALS — BODY MASS INDEX: 33.04 KG/M2 | HEIGHT: 61 IN | WEIGHT: 175 LBS

## 2024-01-09 DIAGNOSIS — Z12.31 ENCOUNTER FOR SCREENING MAMMOGRAM FOR MALIGNANT NEOPLASM OF BREAST: ICD-10-CM

## 2024-01-09 DIAGNOSIS — Z78.0 POSTMENOPAUSAL: ICD-10-CM

## 2024-01-09 PROCEDURE — 77067 SCR MAMMO BI INCL CAD: CPT

## 2024-01-09 PROCEDURE — 77080 DXA BONE DENSITY AXIAL: CPT

## 2024-01-19 ENCOUNTER — APPOINTMENT (OUTPATIENT)
Dept: GENERAL RADIOLOGY | Age: 64
End: 2024-01-19
Payer: COMMERCIAL

## 2024-01-19 ENCOUNTER — APPOINTMENT (OUTPATIENT)
Dept: CT IMAGING | Age: 64
End: 2024-01-19
Payer: COMMERCIAL

## 2024-01-19 ENCOUNTER — HOSPITAL ENCOUNTER (EMERGENCY)
Age: 64
Discharge: HOME | End: 2024-01-19
Attending: EMERGENCY MEDICINE
Payer: COMMERCIAL

## 2024-01-19 VITALS
RESPIRATION RATE: 16 BRPM | TEMPERATURE: 98.4 F | DIASTOLIC BLOOD PRESSURE: 72 MMHG | HEART RATE: 59 BPM | SYSTOLIC BLOOD PRESSURE: 162 MMHG | OXYGEN SATURATION: 98 %
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
## 2024-01-19 DIAGNOSIS — S39.012A STRAIN OF LUMBAR REGION, INITIAL ENCOUNTER: ICD-10-CM

## 2024-01-19 DIAGNOSIS — S16.1XXA STRAIN OF NECK MUSCLE, INITIAL ENCOUNTER: Primary | ICD-10-CM

## 2024-01-19 PROCEDURE — 72125 CT NECK SPINE W/O DYE: CPT

## 2024-01-19 PROCEDURE — 72100 X-RAY EXAM L-S SPINE 2/3 VWS: CPT

## 2024-01-19 PROCEDURE — 99284 EMERGENCY DEPT VISIT MOD MDM: CPT

## 2024-01-19 RX ORDER — CYCLOBENZAPRINE HCL 10 MG
10 TABLET ORAL 3 TIMES DAILY PRN
Qty: 15 TABLET | Refills: 0 | Status: SHIPPED | OUTPATIENT
Start: 2024-01-19 | End: 2024-01-24

## 2024-01-19 ASSESSMENT — ENCOUNTER SYMPTOMS
ABDOMINAL PAIN: 0
NAUSEA: 0
VOMITING: 0
SHORTNESS OF BREATH: 0
FACIAL SWELLING: 0
BACK PAIN: 1

## 2024-01-19 ASSESSMENT — LIFESTYLE VARIABLES
HOW MANY STANDARD DRINKS CONTAINING ALCOHOL DO YOU HAVE ON A TYPICAL DAY: PATIENT DOES NOT DRINK
HOW OFTEN DO YOU HAVE A DRINK CONTAINING ALCOHOL: NEVER

## 2024-01-19 ASSESSMENT — PAIN DESCRIPTION - DESCRIPTORS: DESCRIPTORS: ACHING

## 2024-01-19 ASSESSMENT — PAIN SCALES - GENERAL
PAINLEVEL_OUTOF10: 6
PAINLEVEL_OUTOF10: 6

## 2024-01-19 ASSESSMENT — PAIN DESCRIPTION - ORIENTATION: ORIENTATION: LOWER

## 2024-01-19 ASSESSMENT — PAIN DESCRIPTION - LOCATION: LOCATION: NECK;BACK

## 2024-01-19 NOTE — ED NOTES
I have reviewed discharge instructions with the patient.  The patient verbalized understanding.    Patient left ED via Discharge Method: ambulatory to Home with self.    Opportunity for questions and clarification provided.       Patient given 1 e- scripts.         To continue your aftercare when you leave the hospital, you may receive an automated call from our care team to check in on how you are doing.  This is a free service and part of our promise to provide the best care and service to meet your aftercare needs.” If you have questions, or wish to unsubscribe from this service please call [phone].  Thank you for Choosing our [facility] Emergency [facility].        Priyanka Mckeon, ADDISON  01/19/24 1867

## 2024-01-19 NOTE — DISCHARGE INSTRUCTIONS
Tylenol and ibuprofen alternated every 3-4 hours for pain.  Ice to the sore areas for 15 to 20 minutes every 4 hours while awake for 3 to 5 days followed by heat with a heating pad for 15 minutes 3 times a day for several more days.  Follow-up with your doctor in 10 to 14 days if not significantly improved.  Return if any new, worsening or concerning symptoms.  Muscle relaxer prescribed and begin can be taken every 8 hours as needed for muscle pain and spasm.  You may discontinue this as discussed if it is not helpful.  Do not drive while taking this medication until you know how it affects you
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
## 2024-01-19 NOTE — ED PROVIDER NOTES
and bilateral upper back and neck area.  No numbness tingling or weakness.  No headache or loss of consciousness.  No chest pain, trouble breathing, abdominal pain or other extremity injury other than a left lower leg abrasion           Review of Systems   HENT:  Negative for facial swelling.    Respiratory:  Negative for shortness of breath.    Cardiovascular:  Negative for chest pain.   Gastrointestinal:  Negative for abdominal pain, nausea and vomiting.   Musculoskeletal:  Positive for back pain and neck pain.   Neurological:  Negative for headaches.       Physical Exam     Vitals signs and nursing note reviewed:  Vitals:    01/19/24 1541   BP: (!) 161/79   Pulse: 58   Resp: 15   Temp: 98.4 °F (36.9 °C)   SpO2: 96%      Physical Exam  Vitals and nursing note reviewed.   Constitutional:       Appearance: Normal appearance.   HENT:      Head: Normocephalic and atraumatic.      Nose: Nose normal.      Mouth/Throat:      Mouth: Mucous membranes are moist.   Eyes:      Extraocular Movements: Extraocular movements intact.      Conjunctiva/sclera: Conjunctivae normal.      Pupils: Pupils are equal, round, and reactive to light.   Neck:      Comments: Pain with range of motion, range of motion not limited  Cardiovascular:      Rate and Rhythm: Normal rate and regular rhythm.      Pulses: Normal pulses.      Heart sounds: Normal heart sounds.   Pulmonary:      Effort: Pulmonary effort is normal.      Breath sounds: Normal breath sounds.   Abdominal:      General: There is no distension.      Palpations: Abdomen is soft.      Tenderness: There is no abdominal tenderness. There is no guarding or rebound.   Musculoskeletal:         General: Tenderness (Moderate to severe right lumbar paraspinous tenderness and mild to moderate mid lumbar midline tenderness.  No midline thoracic tenderness noted) present. No swelling. Normal range of motion.      Cervical back: Neck supple. Tenderness (Tenderness in bilateral paraspinous

## 2024-02-23 ENCOUNTER — NURSE ONLY (OUTPATIENT)
Dept: FAMILY MEDICINE CLINIC | Facility: CLINIC | Age: 64
End: 2024-02-23

## 2024-02-23 DIAGNOSIS — Z79.4 TYPE 2 DIABETES MELLITUS WITH RETINOPATHY, WITH LONG-TERM CURRENT USE OF INSULIN, MACULAR EDEMA PRESENCE UNSPECIFIED, UNSPECIFIED LATERALITY, UNSPECIFIED RETINOPATHY SEVERITY (HCC): ICD-10-CM

## 2024-02-23 DIAGNOSIS — E11.319 TYPE 2 DIABETES MELLITUS WITH RETINOPATHY, WITH LONG-TERM CURRENT USE OF INSULIN, MACULAR EDEMA PRESENCE UNSPECIFIED, UNSPECIFIED LATERALITY, UNSPECIFIED RETINOPATHY SEVERITY (HCC): ICD-10-CM

## 2024-02-23 LAB
ANION GAP SERPL CALC-SCNC: 7 MMOL/L (ref 2–11)
BUN SERPL-MCNC: 12 MG/DL (ref 8–23)
CALCIUM SERPL-MCNC: 9.8 MG/DL (ref 8.3–10.4)
CHLORIDE SERPL-SCNC: 111 MMOL/L (ref 103–113)
CO2 SERPL-SCNC: 29 MMOL/L (ref 21–32)
CREAT SERPL-MCNC: 0.9 MG/DL (ref 0.6–1)
EST. AVERAGE GLUCOSE BLD GHB EST-MCNC: 171 MG/DL
GLUCOSE SERPL-MCNC: 46 MG/DL (ref 65–100)
HBA1C MFR BLD: 7.6 % (ref 4.8–5.6)
POTASSIUM SERPL-SCNC: 3.3 MMOL/L (ref 3.5–5.1)
SODIUM SERPL-SCNC: 147 MMOL/L (ref 136–146)

## 2024-02-27 ENCOUNTER — TELEPHONE (OUTPATIENT)
Dept: FAMILY MEDICINE CLINIC | Facility: CLINIC | Age: 64
End: 2024-02-27

## 2024-02-27 DIAGNOSIS — Z79.4 TYPE 2 DIABETES MELLITUS WITH RETINOPATHY, WITH LONG-TERM CURRENT USE OF INSULIN, MACULAR EDEMA PRESENCE UNSPECIFIED, UNSPECIFIED LATERALITY, UNSPECIFIED RETINOPATHY SEVERITY (HCC): Primary | ICD-10-CM

## 2024-02-27 DIAGNOSIS — E11.319 TYPE 2 DIABETES MELLITUS WITH RETINOPATHY, WITH LONG-TERM CURRENT USE OF INSULIN, MACULAR EDEMA PRESENCE UNSPECIFIED, UNSPECIFIED LATERALITY, UNSPECIFIED RETINOPATHY SEVERITY (HCC): Primary | ICD-10-CM

## 2024-02-27 NOTE — TELEPHONE ENCOUNTER
I spoke with patient who stated that [facility] and [facility] not [facility].  Patient is requesting that a Rx for Trulicity be sent to her pharmacy.

## 2024-02-27 NOTE — TELEPHONE ENCOUNTER
Pt no-showed visit today 2/27/24. Wanted to reschedule only with jayesh which put her our until April. She is having trouble getting her Ozempic. Please advise

## 2024-02-28 RX ORDER — DULAGLUTIDE 0.75 MG/.5ML
0.75 INJECTION, SOLUTION SUBCUTANEOUS WEEKLY
Qty: 2 ML | Refills: 0 | Status: SHIPPED | OUTPATIENT
Start: 2024-02-28

## 2024-02-28 NOTE — TELEPHONE ENCOUNTER
Left message via patient's voice mail letting her know that Rx for Trulicity sent to [facility]

## 2024-03-24 DIAGNOSIS — E11.319 TYPE 2 DIABETES MELLITUS WITH RETINOPATHY, WITH LONG-TERM CURRENT USE OF INSULIN, MACULAR EDEMA PRESENCE UNSPECIFIED, UNSPECIFIED LATERALITY, UNSPECIFIED RETINOPATHY SEVERITY (HCC): ICD-10-CM

## 2024-03-24 DIAGNOSIS — Z79.4 TYPE 2 DIABETES MELLITUS WITH RETINOPATHY, WITH LONG-TERM CURRENT USE OF INSULIN, MACULAR EDEMA PRESENCE UNSPECIFIED, UNSPECIFIED LATERALITY, UNSPECIFIED RETINOPATHY SEVERITY (HCC): ICD-10-CM

## 2024-03-25 RX ORDER — DULAGLUTIDE 0.75 MG/.5ML
INJECTION, SOLUTION SUBCUTANEOUS
OUTPATIENT
Start: 2024-03-25

## 2024-03-25 NOTE — TELEPHONE ENCOUNTER
[facility] is requesting a refill of Trulicity.  Last filled by MILY Tomlin.         This provider's requires her patients to call for refill requests and does not accept refill requests from the pharmacy.

## 2024-04-10 NOTE — PROGRESS NOTES
by mouth daily     Dispense:  90 tablet     Refill:  1     Please place rx on file until pt. Requests refill.    LANTUS SOLOSTAR 100 UNIT/ML injection pen     Sig: Inject 50 Units into the skin 2 times daily EREN     Dispense:  90 mL     Refill:  1     Please place rx on file until pt. Requests refill.    atorvastatin (LIPITOR) 20 MG tablet     Sig: Take 1 tablet by mouth at bedtime TAKE 1 TABLET BY MOUTH EVERY DAY     Dispense:  90 tablet     Refill:  1     Please d/c any orders on file for Atorvastatin 10 mg         An electronic signature was used to authenticate this note.  -- Allie Galeas-MILY Holloway     Part of this note was written by using a voice dictation software. The note has been proof read but may still contain some grammatical/other typographical errors.

## 2024-04-11 ENCOUNTER — OFFICE VISIT (OUTPATIENT)
Dept: FAMILY MEDICINE CLINIC | Facility: CLINIC | Age: 64
End: 2024-04-11
Payer: COMMERCIAL

## 2024-04-11 VITALS
WEIGHT: 179 LBS | BODY MASS INDEX: 33.79 KG/M2 | HEIGHT: 61 IN | RESPIRATION RATE: 14 BRPM | TEMPERATURE: 98.9 F | HEART RATE: 61 BPM | OXYGEN SATURATION: 97 %

## 2024-04-11 DIAGNOSIS — E78.5 DYSLIPIDEMIA: ICD-10-CM

## 2024-04-11 DIAGNOSIS — E11.319 TYPE 2 DIABETES MELLITUS WITH RETINOPATHY, WITH LONG-TERM CURRENT USE OF INSULIN, MACULAR EDEMA PRESENCE UNSPECIFIED, UNSPECIFIED LATERALITY, UNSPECIFIED RETINOPATHY SEVERITY (HCC): ICD-10-CM

## 2024-04-11 DIAGNOSIS — B35.1 TINEA UNGUIUM: ICD-10-CM

## 2024-04-11 DIAGNOSIS — N90.4 LICHEN SCLEROSUS ET ATROPHICUS OF THE VULVA: ICD-10-CM

## 2024-04-11 DIAGNOSIS — G89.29 CHRONIC RIGHT SHOULDER PAIN: ICD-10-CM

## 2024-04-11 DIAGNOSIS — M25.511 CHRONIC RIGHT SHOULDER PAIN: ICD-10-CM

## 2024-04-11 DIAGNOSIS — I10 PRIMARY HYPERTENSION: Primary | ICD-10-CM

## 2024-04-11 DIAGNOSIS — Z79.4 TYPE 2 DIABETES MELLITUS WITH RETINOPATHY, WITH LONG-TERM CURRENT USE OF INSULIN, MACULAR EDEMA PRESENCE UNSPECIFIED, UNSPECIFIED LATERALITY, UNSPECIFIED RETINOPATHY SEVERITY (HCC): ICD-10-CM

## 2024-04-11 LAB
ALBUMIN SERPL-MCNC: 3.9 G/DL (ref 3.2–4.6)
ALBUMIN/GLOB SERPL: 1 (ref 0.4–1.6)
ALP SERPL-CCNC: 111 U/L (ref 50–136)
ALT SERPL-CCNC: 41 U/L (ref 12–65)
AST SERPL-CCNC: 24 U/L (ref 15–37)
BILIRUB DIRECT SERPL-MCNC: <0.1 MG/DL
BILIRUB SERPL-MCNC: 0.4 MG/DL (ref 0.2–1.1)
GLOBULIN SER CALC-MCNC: 4 G/DL (ref 2.8–4.5)
PROT SERPL-MCNC: 7.9 G/DL (ref 6.3–8.2)

## 2024-04-11 PROCEDURE — 3051F HG A1C>EQUAL 7.0%<8.0%: CPT | Performed by: PHYSICIAN ASSISTANT

## 2024-04-11 PROCEDURE — 1123F ACP DISCUSS/DSCN MKR DOCD: CPT | Performed by: PHYSICIAN ASSISTANT

## 2024-04-11 PROCEDURE — 99214 OFFICE O/P EST MOD 30 MIN: CPT | Performed by: PHYSICIAN ASSISTANT

## 2024-04-11 RX ORDER — DULAGLUTIDE 0.75 MG/.5ML
0.75 INJECTION, SOLUTION SUBCUTANEOUS WEEKLY
Qty: 2 ML | Refills: 0 | Status: SHIPPED | OUTPATIENT
Start: 2024-04-11

## 2024-04-11 RX ORDER — MELOXICAM 15 MG/1
15 TABLET ORAL DAILY PRN
Qty: 90 TABLET | Refills: 1 | Status: SHIPPED | OUTPATIENT
Start: 2024-04-11

## 2024-04-11 RX ORDER — LOSARTAN POTASSIUM AND HYDROCHLOROTHIAZIDE 12.5; 5 MG/1; MG/1
1 TABLET ORAL DAILY
Qty: 90 TABLET | Refills: 1 | Status: SHIPPED | OUTPATIENT
Start: 2024-04-11

## 2024-04-11 RX ORDER — SEMAGLUTIDE 2.68 MG/ML
2 INJECTION, SOLUTION SUBCUTANEOUS
Qty: 9 ML | Refills: 0 | Status: SHIPPED | OUTPATIENT
Start: 2024-04-11

## 2024-04-11 RX ORDER — CLOBETASOL PROPIONATE 0.5 MG/G
OINTMENT TOPICAL
Qty: 45 G | Refills: 1 | Status: SHIPPED | OUTPATIENT
Start: 2024-04-11

## 2024-04-11 RX ORDER — ATORVASTATIN CALCIUM 20 MG/1
20 TABLET, FILM COATED ORAL NIGHTLY
Qty: 90 TABLET | Refills: 1 | Status: SHIPPED | OUTPATIENT
Start: 2024-04-11

## 2024-04-11 RX ORDER — INSULIN GLARGINE 100 [IU]/ML
50 INJECTION, SOLUTION SUBCUTANEOUS 2 TIMES DAILY
Qty: 90 ML | Refills: 1 | Status: SHIPPED | OUTPATIENT
Start: 2024-04-11

## 2024-04-11 SDOH — ECONOMIC STABILITY: FOOD INSECURITY: WITHIN THE PAST 12 MONTHS, THE FOOD YOU BOUGHT JUST DIDN'T LAST AND YOU DIDN'T HAVE MONEY TO GET MORE.: NEVER TRUE

## 2024-04-11 SDOH — ECONOMIC STABILITY: INCOME INSECURITY: HOW HARD IS IT FOR YOU TO PAY FOR THE VERY BASICS LIKE FOOD, HOUSING, MEDICAL CARE, AND HEATING?: NOT HARD AT ALL

## 2024-04-11 SDOH — ECONOMIC STABILITY: FOOD INSECURITY: WITHIN THE PAST 12 MONTHS, YOU WORRIED THAT YOUR FOOD WOULD RUN OUT BEFORE YOU GOT MONEY TO BUY MORE.: NEVER TRUE

## 2024-04-11 ASSESSMENT — PATIENT HEALTH QUESTIONNAIRE - PHQ9
1. LITTLE INTEREST OR PLEASURE IN DOING THINGS: NOT AT ALL
2. FEELING DOWN, DEPRESSED OR HOPELESS: MORE THAN HALF THE DAYS
SUM OF ALL RESPONSES TO PHQ QUESTIONS 1-9: 2
SUM OF ALL RESPONSES TO PHQ9 QUESTIONS 1 & 2: 2
SUM OF ALL RESPONSES TO PHQ QUESTIONS 1-9: 2

## 2024-04-12 DIAGNOSIS — B35.1 TINEA UNGUIUM: Primary | ICD-10-CM

## 2024-04-12 RX ORDER — TERBINAFINE HYDROCHLORIDE 250 MG/1
250 TABLET ORAL DAILY
Qty: 120 TABLET | Refills: 0 | Status: SHIPPED | OUTPATIENT
Start: 2024-04-12 | End: 2024-08-10

## 2024-04-15 DIAGNOSIS — E78.5 DYSLIPIDEMIA: ICD-10-CM

## 2024-04-16 RX ORDER — ATORVASTATIN CALCIUM 20 MG/1
20 TABLET, FILM COATED ORAL NIGHTLY
Qty: 90 TABLET | Refills: 1 | OUTPATIENT
Start: 2024-04-16

## 2024-04-16 NOTE — TELEPHONE ENCOUNTER
SHELBY Boyer requesting Atorvastatin refills.     MILY Tomlin patient, who would like her patients to call for refills.

## 2024-04-17 ENCOUNTER — HOSPITAL ENCOUNTER (EMERGENCY)
Age: 64
Discharge: HOME | End: 2024-04-17
Payer: COMMERCIAL

## 2024-04-17 ENCOUNTER — TELEPHONE (OUTPATIENT)
Dept: FAMILY MEDICINE CLINIC | Facility: CLINIC | Age: 64
End: 2024-04-17

## 2024-04-17 VITALS
HEART RATE: 61 BPM | BODY MASS INDEX: 33.79 KG/M2 | RESPIRATION RATE: 16 BRPM | DIASTOLIC BLOOD PRESSURE: 80 MMHG | HEIGHT: 61 IN | TEMPERATURE: 98.5 F | WEIGHT: 179 LBS | OXYGEN SATURATION: 98 % | SYSTOLIC BLOOD PRESSURE: 133 MMHG

## 2024-04-17 DIAGNOSIS — L29.9 PRURITIC DISORDER: ICD-10-CM
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
## 2024-04-17 PROCEDURE — 99283 EMERGENCY DEPT VISIT LOW MDM: CPT

## 2024-04-17 PROCEDURE — 6370000000 HC RX 637 (ALT 250 FOR IP): Performed by: NURSE PRACTITIONER

## 2024-04-17 RX ORDER — HYDROXYZINE HYDROCHLORIDE 25 MG/1
25 TABLET, FILM COATED ORAL EVERY 8 HOURS PRN
Qty: 15 TABLET | Refills: 0 | Status: SHIPPED | OUTPATIENT
Start: 2024-04-17 | End: 2024-04-22

## 2024-04-17 RX ORDER — HYDROXYZINE HYDROCHLORIDE 25 MG/1
25 TABLET, FILM COATED ORAL
Status: COMPLETED | OUTPATIENT
Start: 2024-04-17 | End: 2024-04-17

## 2024-04-17 RX ORDER — PERMETHRIN 50 MG/G
CREAM TOPICAL
Qty: 60 G | Refills: 0 | Status: SHIPPED | OUTPATIENT
Start: 2024-04-17

## 2024-04-17 RX ORDER — CEPHALEXIN 500 MG/1
500 CAPSULE ORAL 4 TIMES DAILY
Qty: 28 CAPSULE | Refills: 0 | Status: SHIPPED | OUTPATIENT
Start: 2024-04-17 | End: 2024-04-24

## 2024-04-17 RX ADMIN — HYDROXYZINE HYDROCHLORIDE 25 MG: 25 TABLET, FILM COATED ORAL at 18:44

## 2024-04-17 ASSESSMENT — PAIN SCALES - GENERAL
PAINLEVEL_OUTOF10: 0
PAINLEVEL_OUTOF10: 0

## 2024-04-17 ASSESSMENT — PAIN - FUNCTIONAL ASSESSMENT
PAIN_FUNCTIONAL_ASSESSMENT: 0-10
PAIN_FUNCTIONAL_ASSESSMENT: 0-10

## 2024-04-17 NOTE — ED NOTES
Patient mobility status  with no difficulty. Provider aware     I have reviewed discharge instructions with the patient.  The patient verbalized understanding.    Patient left ED via Discharge Method: ambulatory to Home with Extended Family:.    Opportunity for questions and clarification provided.     Patient given 3 scripts.           Rosanna Blackman, RN  04/17/24 2033

## 2024-04-17 NOTE — ED PROVIDER NOTES
Emergency Department Provider Note       PCP: Allie Dobbins PA   Age: 64 y.o.   Sex: female     DISPOSITION Decision To Discharge 04/17/2024 06:51:45 PM       ICD-10-CM    1. Bug bite, initial encounter  W57.XXXA       2. Pruritic disorder  L29.9           Medical Decision Making     As in HPI.  Patient is pleasant very well-appearing.  She endorses itching and no other complaints.  Denies all other.  No increased respiratory effort no dyspnea or chest pain.  No fever or chills.  Denies walking in woods.  Denies known insect bites, does not suspect tick bites.  Does not suspect mosquito or ant bites.  Concern for bedbugs.  No similar symptoms in the past.  No one else staying with her has similar symptoms.  She has no mucosal involvement lungs are clear as is airway.  Noted raised small lesions on forearms consistent with some sort of insect bites.  Nothing between fingers.  No  complaint.  No lower extremity involvement.  No facial or throat involvement.  I do not see any rash or other lesions on my exam.  I have low clinical suspicion at this time for anaphylactic reaction, severe allergic reaction, liver failure cirrhosis or hepatitis, sepsis, Bantry spotted fever, Lyme's disease or other acute emergent process.  Cannot exclude scabies, bedbugs, but also suspect other insect bite.  No obvious secondary infection or cellulitis, no abscess suspected.  Will give hydralazine in the ED, discharged with prescription for same.  Will prescribe permethrin and Keflex for concern for bacterial secondary infection.  To follow-up with her PCP tomorrow or the following day for reevaluation return to the ED for new, worsening or concerning symptoms  Patient is well-hydrated appearing, no distress.  Nontoxic-appearing, tolerating oral intake, hemodynamically stable. All findings and plan were discussed with the patient. All questions answered. Discussed with the patient that an unremarkable evaluation in the

## 2024-04-17 NOTE — ED TRIAGE NOTES
Pt reports recently returned from trip and has had itching and small generalized raised bumps, concerned about bed bugs but has not seen any bugs. Itching started Sunday     A&Ox4

## 2024-04-17 NOTE — TELEPHONE ENCOUNTER
Patient states that she was out of town and was ifected by bedbugs.      The patient states she has bites on her body and was itching.  She was informed she would need to be seen.      The patient asked if she can just go to ED or UC.  The medical assistant stated \"yes\".  The patient stated she would do that then.

## 2024-04-17 NOTE — DISCHARGE INSTR - COC
Continuity of Care Form    Patient Name: Tiffanie Cruz   :  1960  MRN:  [id number]    Admit date:  2024  Discharge date:  ***    Code Status Order: No Order   Advance Directives:     Admitting Physician:  No admitting provider for patient encounter.  PCP: Allie Dobbins PA    Discharging Nurse: ***  Discharging Hospital Unit/Room#: D01/D01  Discharging Unit Phone Number: ***    Emergency Contact:   Extended Emergency Contact Information  Primary Emergency Contact: Ana Cruz   [facility]  Home Phone: [phone]  Mobile Phone: [phone]  Relation: Child    Past Surgical History:  Past Surgical History:   Procedure Laterality Date     SECTION      HYSTERECTOMY (CERVIX STATUS UNKNOWN)      ORTHOPEDIC SURGERY Right     knee surgery, menniscus tear       Immunization History:   Immunization History   Administered Date(s) Administered    COVID-19, J&J, (age 18y+), IM, 0.5 mL 2021    Influenza Virus Vaccine 2023    Influenza, FLUBLOK, (age 18 y+), PF, 0.5mL 2022    TDaP, ADACEL (age 10y-64y), BOOSTRIX (age 10y+), IM, 0.5mL 2022    Zoster Recombinant (Shingrix) 2023       Active Problems:  Patient Active Problem List   Diagnosis Code    Cardiac murmur R01.1    Dyslipidemia E78.5    Primary hypertension I10    Obesity due to excess calories E66.09    Vaginal polyp N84.2    Type 2 diabetes mellitus with retinopathy, with long-term current use of insulin ([facility]) E11.319, Z79.4    Chronic right shoulder pain M25.511, G89.29    Lichen sclerosus et atrophicus of the vulva N90.4       Isolation/Infection:   Isolation            No Isolation          Patient Infection Status       None to display            Nurse Assessment:  Last Vital Signs: /80   Pulse 61   Temp 98.5 °F (36.9 °C) (Oral)   Resp 16   Ht 1.549 m (5' 1\")   Wt 81.2 kg (179 lb)   LMP  (LMP Unknown)   SpO2 98%   BMI 33.82 kg/m²     Last documented pain score (0-10

## 2024-05-14 DIAGNOSIS — E11.319 TYPE 2 DIABETES MELLITUS WITH RETINOPATHY, WITH LONG-TERM CURRENT USE OF INSULIN, MACULAR EDEMA PRESENCE UNSPECIFIED, UNSPECIFIED LATERALITY, UNSPECIFIED RETINOPATHY SEVERITY (HCC): ICD-10-CM

## 2024-05-14 DIAGNOSIS — Z79.4 TYPE 2 DIABETES MELLITUS WITH RETINOPATHY, WITH LONG-TERM CURRENT USE OF INSULIN, MACULAR EDEMA PRESENCE UNSPECIFIED, UNSPECIFIED LATERALITY, UNSPECIFIED RETINOPATHY SEVERITY (HCC): ICD-10-CM

## 2024-05-14 NOTE — TELEPHONE ENCOUNTER
Requested Renewals     Name from pharmacy: TRULICITY 0.75 MG/0.5 ML PEN         Will file in chart as: TRULICITY 0.75 MG/0.5ML SOPN SC injection    Sig: INJECT 0.75 MG SUBCUTANEOUSLY ONE TIME PER WEEK    Disp: Not specified (Pharmacy requested: 2 Device)    Refills: Not specified    Start: 5/14/2024    Class: Normal    Non-formulary For: Type 2 diabetes mellitus with retinopathy, with long-term current use of insulin, macular edema presence unspecified, unspecified laterality, unspecified retinopathy severity (HCC)    Last ordered: 1 month ago (4/11/2024) by MILY Tomlin    Last refill: 4/12/2024    Rx #: [id number]       To be filled at: [id number] - [place] - [address] [phone] - F [phone]       Next appt 7/5/2024

## 2024-05-15 RX ORDER — DULAGLUTIDE 0.75 MG/.5ML
INJECTION, SOLUTION SUBCUTANEOUS
OUTPATIENT
Start: 2024-05-15

## 2024-05-15 NOTE — TELEPHONE ENCOUNTER
I spoke with patient who stated that she is still taking Trulicity, because the pharmacy could not get Ozempic.  She said that she has not taken Ozempic since last year.

## 2024-05-15 NOTE — TELEPHONE ENCOUNTER
Please verify with patient which injectable medication she is taking for tx of Diabetes.  Per my note in April, we increased the dose of Ozempic to 2mg for her.  I don't think she's supposed to be on Trulicity.

## 2024-05-16 RX ORDER — DULAGLUTIDE 1.5 MG/.5ML
1.5 INJECTION, SOLUTION SUBCUTANEOUS WEEKLY
Qty: 2 ML | Refills: 5 | Status: SHIPPED | OUTPATIENT
Start: 2024-05-16

## 2024-05-16 NOTE — TELEPHONE ENCOUNTER
Since we discussed increasing the dose of Ozempic at her last visit in April, then I am going to increase her dose of Trulicity to 1.5 mg weekly.

## 2024-05-16 NOTE — TELEPHONE ENCOUNTER
I spoke with patient who voices understanding and acceptance of this advice and will call back if any further questions or concerns.

## 2024-07-08 ENCOUNTER — TELEPHONE (OUTPATIENT)
Dept: FAMILY MEDICINE CLINIC | Facility: CLINIC | Age: 64
End: 2024-07-08

## 2024-07-08 NOTE — TELEPHONE ENCOUNTER
Patient daughter states that patient is confused, Urinary incontinence,no appetite, possible low blood sugar since last Wednesday.    Scheduled pt appointment on 07/11/2024 at 9:30 AM    Patient daughter request a call back to discuss patient symptoms [phone]

## 2024-07-09 NOTE — TELEPHONE ENCOUNTER
Pt may have a UTI--I would recommend seeing if we can get her in to see one of our other providers (if we have availability) or go to UC if symptoms worsen at all.

## 2024-09-20 ENCOUNTER — LAB (OUTPATIENT)
Dept: FAMILY MEDICINE CLINIC | Facility: CLINIC | Age: 64
End: 2024-09-20

## 2024-09-20 DIAGNOSIS — E78.5 DYSLIPIDEMIA: ICD-10-CM

## 2024-09-20 DIAGNOSIS — E11.319 TYPE 2 DIABETES MELLITUS WITH RETINOPATHY, WITH LONG-TERM CURRENT USE OF INSULIN, MACULAR EDEMA PRESENCE UNSPECIFIED, UNSPECIFIED LATERALITY, UNSPECIFIED RETINOPATHY SEVERITY (HCC): ICD-10-CM

## 2024-09-20 DIAGNOSIS — Z79.4 TYPE 2 DIABETES MELLITUS WITH RETINOPATHY, WITH LONG-TERM CURRENT USE OF INSULIN, MACULAR EDEMA PRESENCE UNSPECIFIED, UNSPECIFIED LATERALITY, UNSPECIFIED RETINOPATHY SEVERITY (HCC): ICD-10-CM

## 2024-09-20 DIAGNOSIS — I10 PRIMARY HYPERTENSION: ICD-10-CM

## 2024-09-20 LAB
ALBUMIN SERPL-MCNC: 3.5 G/DL (ref 3.2–4.6)
ALBUMIN/GLOB SERPL: 0.9 (ref 1–1.9)
ALP SERPL-CCNC: 100 U/L (ref 35–104)
ALT SERPL-CCNC: 26 U/L (ref 12–65)
ANION GAP SERPL CALC-SCNC: 8 MMOL/L (ref 9–18)
AST SERPL-CCNC: 24 U/L (ref 15–37)
BILIRUB SERPL-MCNC: <0.2 MG/DL (ref 0–1.2)
BUN SERPL-MCNC: 11 MG/DL (ref 8–23)
CALCIUM SERPL-MCNC: 9.6 MG/DL (ref 8.8–10.2)
CHLORIDE SERPL-SCNC: 103 MMOL/L (ref 98–107)
CHOLEST SERPL-MCNC: 197 MG/DL (ref 0–200)
CO2 SERPL-SCNC: 30 MMOL/L (ref 20–28)
CREAT SERPL-MCNC: 0.88 MG/DL (ref 0.6–1.1)
CREAT UR-MCNC: 96 MG/DL (ref 28–217)
EST. AVERAGE GLUCOSE BLD GHB EST-MCNC: 223 MG/DL
GLOBULIN SER CALC-MCNC: 4.1 G/DL (ref 2.3–3.5)
GLUCOSE SERPL-MCNC: 177 MG/DL (ref 70–99)
HBA1C MFR BLD: 9.4 % (ref 0–5.6)
HDLC SERPL-MCNC: 52 MG/DL (ref 40–60)
HDLC SERPL: 3.8 (ref 0–5)
LDLC SERPL CALC-MCNC: 120 MG/DL (ref 0–100)
MICROALBUMIN UR-MCNC: <1.2 MG/DL (ref 0–20)
MICROALBUMIN/CREAT UR-RTO: NORMAL MG/G (ref 0–30)
POTASSIUM SERPL-SCNC: 3.7 MMOL/L (ref 3.5–5.1)
PROT SERPL-MCNC: 7.6 G/DL (ref 6.3–8.2)
SODIUM SERPL-SCNC: 140 MMOL/L (ref 136–145)
TRIGL SERPL-MCNC: 124 MG/DL (ref 0–150)
VLDLC SERPL CALC-MCNC: 25 MG/DL (ref 6–23)

## 2024-09-20 RX ORDER — ATORVASTATIN CALCIUM 20 MG/1
20 TABLET, FILM COATED ORAL NIGHTLY
Qty: 90 TABLET | Refills: 1 | OUTPATIENT
Start: 2024-09-20

## 2024-10-04 NOTE — PROGRESS NOTES
Tiffanie Cruz (: 1960) is a 64 y.o. female, an established patient, is here for evaluation of the following chief complaint(s):  Chief Complaint   Patient presents with    Shoulder Pain    Medication Refill     Requesting refill for Lantus    Hypertension    Diabetes    Hyperlipidemia          ASSESSMENT/PLAN:  Tiffanie was seen today for shoulder pain, medication refill, hypertension, diabetes and hyperlipidemia.    Diagnoses and all orders for this visit:    Primary hypertension  -     losartan-hydroCHLOROthiazide (HYZAAR) 50-12.5 MG per tablet; Take 1 tablet by mouth daily  -     Comprehensive Metabolic Panel; Future    Type 2 diabetes mellitus with retinopathy, with long-term current use of insulin, macular edema presence unspecified, unspecified laterality, unspecified retinopathy severity (HCC)  -     LANTUS SOLOSTAR 100 UNIT/ML injection pen; Inject 50 Units into the skin 2 times daily EREN  -     semaglutide, 2 MG/DOSE, (OZEMPIC, 2 MG/DOSE,) 8 MG/3ML SOPN sc injection; Inject 2 mg into the skin every 7 days  -     Comprehensive Metabolic Panel; Future  -     Hemoglobin A1C; Future  -     Lipid Panel; Future    Dyslipidemia  -     atorvastatin (LIPITOR) 40 MG tablet; Take 1 tablet by mouth at bedtime TAKE 1 TABLET BY MOUTH EVERY DAY  -     Comprehensive Metabolic Panel; Future  -     Lipid Panel; Future    Lichen sclerosus et atrophicus of the vulva    Chronic right shoulder pain  -     [facility]  -     meloxicam (MOBIC) 15 MG tablet; Take 1 tablet by mouth daily as needed for Pain (right shoulder pain) Take w/ food      She missed her appt w/ me on 24 for:  HTN/DM/HLD mgt.  Labs were recently drawn on 24, so we can address these problems for her today.      I reviewed recent lab results w/  Ms. Cruz.      Diabetes is poorly controlled.  Her A1c has increased to 9.4% since last February.  Currently prescribed:   Diabetic Medications       Insulin

## 2024-10-07 ENCOUNTER — OFFICE VISIT (OUTPATIENT)
Dept: FAMILY MEDICINE CLINIC | Facility: CLINIC | Age: 64
End: 2024-10-07
Payer: COMMERCIAL

## 2024-10-07 VITALS
SYSTOLIC BLOOD PRESSURE: 102 MMHG | HEART RATE: 71 BPM | DIASTOLIC BLOOD PRESSURE: 68 MMHG | BODY MASS INDEX: 34.36 KG/M2 | TEMPERATURE: 98 F | WEIGHT: 182 LBS | HEIGHT: 61 IN | OXYGEN SATURATION: 96 % | RESPIRATION RATE: 14 BRPM

## 2024-10-07 DIAGNOSIS — E11.319 TYPE 2 DIABETES MELLITUS WITH RETINOPATHY, WITH LONG-TERM CURRENT USE OF INSULIN, MACULAR EDEMA PRESENCE UNSPECIFIED, UNSPECIFIED LATERALITY, UNSPECIFIED RETINOPATHY SEVERITY (HCC): ICD-10-CM

## 2024-10-07 DIAGNOSIS — I10 PRIMARY HYPERTENSION: Primary | ICD-10-CM

## 2024-10-07 DIAGNOSIS — M25.511 CHRONIC RIGHT SHOULDER PAIN: ICD-10-CM

## 2024-10-07 DIAGNOSIS — E78.5 DYSLIPIDEMIA: ICD-10-CM

## 2024-10-07 DIAGNOSIS — N90.4 LICHEN SCLEROSUS ET ATROPHICUS OF THE VULVA: ICD-10-CM

## 2024-10-07 DIAGNOSIS — Z79.4 TYPE 2 DIABETES MELLITUS WITH RETINOPATHY, WITH LONG-TERM CURRENT USE OF INSULIN, MACULAR EDEMA PRESENCE UNSPECIFIED, UNSPECIFIED LATERALITY, UNSPECIFIED RETINOPATHY SEVERITY (HCC): ICD-10-CM

## 2024-10-07 DIAGNOSIS — G89.29 CHRONIC RIGHT SHOULDER PAIN: ICD-10-CM

## 2024-10-07 PROCEDURE — 3074F SYST BP LT 130 MM HG: CPT | Performed by: PHYSICIAN ASSISTANT

## 2024-10-07 PROCEDURE — G2211 COMPLEX E/M VISIT ADD ON: HCPCS | Performed by: PHYSICIAN ASSISTANT

## 2024-10-07 PROCEDURE — 3078F DIAST BP <80 MM HG: CPT | Performed by: PHYSICIAN ASSISTANT

## 2024-10-07 PROCEDURE — 3046F HEMOGLOBIN A1C LEVEL >9.0%: CPT | Performed by: PHYSICIAN ASSISTANT

## 2024-10-07 PROCEDURE — 1123F ACP DISCUSS/DSCN MKR DOCD: CPT | Performed by: PHYSICIAN ASSISTANT

## 2024-10-07 PROCEDURE — 99214 OFFICE O/P EST MOD 30 MIN: CPT | Performed by: PHYSICIAN ASSISTANT

## 2024-10-07 RX ORDER — ATORVASTATIN CALCIUM 40 MG/1
40 TABLET, FILM COATED ORAL NIGHTLY
Qty: 90 TABLET | Refills: 0 | Status: SHIPPED | OUTPATIENT
Start: 2024-10-07

## 2024-10-07 RX ORDER — MELOXICAM 15 MG/1
15 TABLET ORAL DAILY PRN
Qty: 90 TABLET | Refills: 1 | Status: SHIPPED | OUTPATIENT
Start: 2024-10-07

## 2024-10-07 RX ORDER — LOSARTAN POTASSIUM AND HYDROCHLOROTHIAZIDE 12.5; 5 MG/1; MG/1
1 TABLET ORAL DAILY
Qty: 90 TABLET | Refills: 0 | Status: SHIPPED | OUTPATIENT
Start: 2024-10-07

## 2024-10-07 RX ORDER — INSULIN GLARGINE 100 [IU]/ML
50 INJECTION, SOLUTION SUBCUTANEOUS 2 TIMES DAILY
Qty: 90 ML | Refills: 1 | Status: SHIPPED | OUTPATIENT
Start: 2024-10-07

## 2024-10-07 RX ORDER — SEMAGLUTIDE 2.68 MG/ML
2 INJECTION, SOLUTION SUBCUTANEOUS
Qty: 9 ML | Refills: 0 | Status: SHIPPED | OUTPATIENT
Start: 2024-10-07

## (undated) DEVICE — NEEDLE HYPO 25GA L1.5IN BLU POLYPR HUB S STL REG BVL STR

## (undated) DEVICE — ZIMMER® STERILE DISPOSABLE TOURNIQUET CUFF WITH PLC, DUAL PORT, SINGLE BLADDER, 18 IN. (46 CM)

## (undated) DEVICE — JAR SPEC COLL C30ML 15ML PREFILL VOL POLYPR 10% NEUT BUFF

## (undated) DEVICE — STERILE HOOK LOCK LATEX FREE ELASTIC BANDAGE 3INX5YD: Brand: HOOK LOCK™

## (undated) DEVICE — SOLUTION IV 1000ML 0.9% SOD CHL

## (undated) DEVICE — DERMABOND SKIN ADH 0.7ML -- DERMABOND ADVANCED 12/BX

## (undated) DEVICE — STERILE HOOK LOCK LATEX FREE ELASTIC BANDAGE 2INX5YD: Brand: HOOK LOCK™

## (undated) DEVICE — PADDING CAST W2INXL4YD ST COT COHESIVE HND TEARABLE SPEC

## (undated) DEVICE — SURGICAL PROCEDURE PACK BASIC ST FRANCIS

## (undated) DEVICE — PADDING CAST COHESIVE 4 YDX3 IN HND TEARABLE COTTON SPEC 100

## (undated) DEVICE — (D)PREP SKN CHLRAPRP APPL 26ML -- CONVERT TO ITEM 371833

## (undated) DEVICE — REM POLYHESIVE ADULT PATIENT RETURN ELECTRODE: Brand: VALLEYLAB

## (undated) DEVICE — SPLINT THMB W3XL12IN FBRGLS PD PRECUT LTWT DURABLE FAST SET

## (undated) DEVICE — DRAPE,HAND,STERILE: Brand: MEDLINE